# Patient Record
Sex: FEMALE | Race: OTHER
[De-identification: names, ages, dates, MRNs, and addresses within clinical notes are randomized per-mention and may not be internally consistent; named-entity substitution may affect disease eponyms.]

---

## 2018-03-03 ENCOUNTER — HOSPITAL ENCOUNTER (INPATIENT)
Dept: HOSPITAL 74 - JER | Age: 58
LOS: 3 days | Discharge: HOME | DRG: 207 | End: 2018-03-06
Attending: INTERNAL MEDICINE | Admitting: INTERNAL MEDICINE
Payer: COMMERCIAL

## 2018-03-03 VITALS — BODY MASS INDEX: 26.6 KG/M2

## 2018-03-03 DIAGNOSIS — E87.6: ICD-10-CM

## 2018-03-03 DIAGNOSIS — R65.10: ICD-10-CM

## 2018-03-03 DIAGNOSIS — I08.1: ICD-10-CM

## 2018-03-03 DIAGNOSIS — R00.0: ICD-10-CM

## 2018-03-03 DIAGNOSIS — R50.9: ICD-10-CM

## 2018-03-03 DIAGNOSIS — D72.828: ICD-10-CM

## 2018-03-03 DIAGNOSIS — I31.9: Primary | ICD-10-CM

## 2018-03-03 DIAGNOSIS — E83.39: ICD-10-CM

## 2018-03-03 DIAGNOSIS — I48.91: ICD-10-CM

## 2018-03-03 DIAGNOSIS — I31.3: ICD-10-CM

## 2018-03-03 DIAGNOSIS — K80.50: ICD-10-CM

## 2018-03-03 DIAGNOSIS — R07.89: ICD-10-CM

## 2018-03-03 LAB
ALBUMIN SERPL-MCNC: 3.4 G/DL (ref 3.4–5)
ALBUMIN SERPL-MCNC: 3.8 G/DL (ref 3.4–5)
ALP SERPL-CCNC: 126 U/L (ref 45–117)
ALP SERPL-CCNC: 139 U/L (ref 45–117)
ALT SERPL-CCNC: 49 U/L (ref 12–78)
ALT SERPL-CCNC: 51 U/L (ref 12–78)
AMYLASE SERPL-CCNC: 57 U/L (ref 25–115)
ANION GAP SERPL CALC-SCNC: 12 MMOL/L (ref 8–16)
ANION GAP SERPL CALC-SCNC: 6 MMOL/L (ref 8–16)
APPEARANCE UR: CLEAR
APTT BLD: 32.6 SECONDS (ref 26.9–34.4)
AST SERPL-CCNC: 27 U/L (ref 15–37)
AST SERPL-CCNC: 31 U/L (ref 15–37)
BASOPHILS # BLD: 0.3 % (ref 0–2)
BILIRUB SERPL-MCNC: 0.4 MG/DL (ref 0.2–1)
BILIRUB SERPL-MCNC: 0.6 MG/DL (ref 0.2–1)
BILIRUB UR STRIP.AUTO-MCNC: NEGATIVE MG/DL
BUN SERPL-MCNC: 11 MG/DL (ref 7–18)
BUN SERPL-MCNC: 12 MG/DL (ref 7–18)
CALCIUM SERPL-MCNC: 8.1 MG/DL (ref 8.5–10.1)
CALCIUM SERPL-MCNC: 8.3 MG/DL (ref 8.5–10.1)
CHLORIDE SERPL-SCNC: 104 MMOL/L (ref 98–107)
CHLORIDE SERPL-SCNC: 108 MMOL/L (ref 98–107)
CHOLEST SERPL-MCNC: 155 MG/DL (ref 50–200)
CO2 SERPL-SCNC: 23 MMOL/L (ref 21–32)
CO2 SERPL-SCNC: 26 MMOL/L (ref 21–32)
COLOR UR: YELLOW
CREAT SERPL-MCNC: 0.5 MG/DL (ref 0.55–1.02)
CREAT SERPL-MCNC: 0.6 MG/DL (ref 0.55–1.02)
DEPRECATED RDW RBC AUTO: 13.5 % (ref 11.6–15.6)
EOSINOPHIL # BLD: 0 % (ref 0–4.5)
EPITH CASTS URNS QL MICRO: (no result) /HPF
GLUCOSE SERPL-MCNC: 109 MG/DL (ref 74–106)
GLUCOSE SERPL-MCNC: 150 MG/DL (ref 74–106)
HCT VFR BLD CALC: 37.2 % (ref 32.4–45.2)
HDLC SERPL-MCNC: 62 MG/DL (ref 40–60)
HGB BLD-MCNC: 12.5 GM/DL (ref 10.7–15.3)
INR BLD: 1.1 (ref 0.82–1.09)
KETONES UR QL STRIP: NEGATIVE
LDLC SERPL CALC-MCNC: 87 MG/DL (ref 5–100)
LEUKOCYTE ESTERASE UR QL STRIP.AUTO: NEGATIVE
LIPASE SERPL-CCNC: 72 U/L (ref 73–393)
LYMPHOCYTES # BLD: 3.5 % (ref 8–40)
MAGNESIUM SERPL-MCNC: 2.1 MG/DL (ref 1.8–2.4)
MCH RBC QN AUTO: 31.3 PG (ref 25.7–33.7)
MCHC RBC AUTO-ENTMCNC: 33.7 G/DL (ref 32–36)
MCV RBC: 92.9 FL (ref 80–96)
MONOCYTES # BLD AUTO: 4 % (ref 3.8–10.2)
MUCOUS THREADS URNS QL MICRO: (no result)
NEUTROPHILS # BLD: 92.2 % (ref 42.8–82.8)
NITRITE UR QL STRIP: NEGATIVE
PH UR: 6 [PH] (ref 5–8)
PHOSPHATE SERPL-MCNC: 3.5 MG/DL (ref 2.5–4.9)
PLATELET # BLD AUTO: 264 K/MM3 (ref 134–434)
PMV BLD: 8.8 FL (ref 7.5–11.1)
POTASSIUM SERPLBLD-SCNC: 3.3 MMOL/L (ref 3.5–5.1)
POTASSIUM SERPLBLD-SCNC: 4.3 MMOL/L (ref 3.5–5.1)
PROT SERPL-MCNC: 6.8 G/DL (ref 6.4–8.2)
PROT SERPL-MCNC: 7.5 G/DL (ref 6.4–8.2)
PROT UR QL STRIP: NEGATIVE
PROT UR QL STRIP: NEGATIVE
PT PNL PPP: 12.4 SEC (ref 9.98–11.88)
RBC # BLD AUTO: 4 M/MM3 (ref 3.6–5.2)
RBC # UR STRIP: (no result) /UL
SODIUM SERPL-SCNC: 139 MMOL/L (ref 136–145)
SODIUM SERPL-SCNC: 140 MMOL/L (ref 136–145)
SP GR UR: 1.02 (ref 1–1.03)
TRIGL SERPL-MCNC: 51 MG/DL (ref 35–160)
UROBILINOGEN UR STRIP-MCNC: NEGATIVE MG/DL (ref 0.2–1)
WBC # BLD AUTO: 14.7 K/MM3 (ref 4–10)

## 2018-03-03 RX ADMIN — FAMOTIDINE SCH MLS/HR: 20 INJECTION, SOLUTION INTRAVENOUS at 10:30

## 2018-03-03 RX ADMIN — FAMOTIDINE SCH MLS/HR: 20 INJECTION, SOLUTION INTRAVENOUS at 22:13

## 2018-03-03 RX ADMIN — PROMETHAZINE HYDROCHLORIDE PRN MG: 25 INJECTION INTRAMUSCULAR; INTRAVENOUS at 17:34

## 2018-03-03 RX ADMIN — DEXTROSE AND SODIUM CHLORIDE SCH MLS/HR: 5; 900 INJECTION, SOLUTION INTRAVENOUS at 15:15

## 2018-03-03 RX ADMIN — CEFTRIAXONE SCH MLS/HR: 1 INJECTION, SOLUTION INTRAVENOUS at 13:17

## 2018-03-03 RX ADMIN — HEPARIN SODIUM SCH UNIT: 5000 INJECTION, SOLUTION INTRAVENOUS; SUBCUTANEOUS at 22:13

## 2018-03-03 RX ADMIN — ACETAMINOPHEN PRN MG: 325 TABLET ORAL at 17:11

## 2018-03-03 RX ADMIN — HEPARIN SODIUM SCH: 5000 INJECTION, SOLUTION INTRAVENOUS; SUBCUTANEOUS at 14:25

## 2018-03-03 RX ADMIN — HEPARIN SODIUM SCH UNIT: 5000 INJECTION, SOLUTION INTRAVENOUS; SUBCUTANEOUS at 10:30

## 2018-03-03 NOTE — EKG
Test Reason : 

Blood Pressure : ***/*** mmHG

Vent. Rate : 076 BPM     Atrial Rate : 076 BPM

   P-R Int : 156 ms          QRS Dur : 090 ms

    QT Int : 396 ms       P-R-T Axes : 061 036 030 degrees

   QTc Int : 445 ms

 

NORMAL SINUS RHYTHM

WHEN COMPARED WITH ECG OF 03-MAR-2018 04:17,

NO SIGNIFICANT CHANGE WAS FOUND

Confirmed by MD SUSAN, NICOLA ([id number]) on 3/3/2018 11:37:37 AM

 

Referred By: SCOUT GASCA           Confirmed By:NICOLA DAVIS MD

## 2018-03-03 NOTE — EKG
Test Reason : 

Blood Pressure : ***/*** mmHG

Vent. Rate : 075 BPM     Atrial Rate : 075 BPM

   P-R Int : 152 ms          QRS Dur : 086 ms

    QT Int : 388 ms       P-R-T Axes : 048 032 024 degrees

   QTc Int : 433 ms

 

*** POOR DATA QUALITY, INTERPRETATION MAY BE ADVERSELY AFFECTED

NORMAL SINUS RHYTHM

NORMAL ECG

NO PREVIOUS ECGS AVAILABLE

Confirmed by MD SUSAN, NICOLA ([id number]) on 3/3/2018 11:38:17 AM

 

Referred By:             Confirmed By:NICOLA DAVIS MD

## 2018-03-03 NOTE — PDOC
History of Present Illness





- General


Chief Complaint: Chest Pain


Stated Complaint: CHEST DISCOMFORT


Time Seen by Provider: 03/03/18 02:47


History Source: Patient, Family


Exam Limitations: Language Barrier





- History of Present Illness


Initial Comments: 





03/03/18 02:59





58 y/o F with no significant PMH who presents to the ED c/o worsening chest 

pain over the past 10 hrs. As per pt, at 5PM she started to develop dull chest 

pressure in her mid-sternal area. Pt tried to sleep this evening and was woken 

up, as the pain became a 10/10, with sternal pressure at rest, radiating to the 

back. Her pain is worsened by movement, particularly by leaning forward, and is 

a/w SOB. Pain has no alleviating factors. She also endorses cold fingertips and 

nausea. Pt denies HA, fever, diaphoresis, cough, chills, V/D, recent illnesses, 

or changes in urinary or bowel function.





She had the same pain a week ago, however it subsided on its own.





PMH: none


PsxH: denies


meds: denies


allergies: NKDA


FH: mother- had similar pain in past


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 denies cigarette, alcohol, or 

recreational drug use.








03/03/18 05:45








Past History





- Past Medical History


Allergies/Adverse Reactions: 


 Allergies











Allergy/AdvReac Type Severity Reaction Status Date / Time


 


No Known Allergies Allergy   Verified 03/03/18 02:48











Home Medications: 


Ambulatory Orders





NK [No Known Home Medication]  03/03/18 











- Suicide/Smoking/Psychosocial Hx


Smoking History: Smoker current status UNK


Have you smoked in the past 12 months: No


Information on smoking cessation initiated: No


Hx Alcohol Use: No


Drug/Substance Use Hx: No





**Review of Systems





- Review of Systems


Able to Perform ROS?: Yes


Is the patient limited English proficient: Yes


Respiratory: Yes: Shortness of Breath, SOB at Rest


Cardiac (ROS): Yes: Chest Pain


All Other Systems: Reviewed and Negative





*Physical Exam





- Vital Signs


 Last Vital Signs











Temp Pulse Resp BP Pulse Ox


 


 98.1 F   78   22   134/79   99 


 


 03/03/18 02:46  03/03/18 02:46  03/03/18 02:46  03/03/18 02:46  03/03/18 02:46














- Physical Exam


General Appearance: Yes: Nourished, Mild Distress


HEENT: positive: EOMI, JULIO CÉSAR


Neck: positive: Supple


Respiratory/Chest: positive: Chest Tender, Lungs Clear, Normal Breath Sounds


Cardiovascular: positive: Regular Rhythm, Regular Rate, S1, S2


Vascular Pulses: Dorsalis-Pedis (R): 2+, Doralis-Pedis (L): 2+


Gastrointestinal/Abdominal: positive: Normal Bowel Sounds, Soft


Musculoskeletal: positive: Normal Inspection


Extremity: positive: Normal Range of Motion


Neurologic: positive: CNs II-XII NML intact





**Heart Score/ECG Review





- History


History: Moderately suspicious





- Electrocardiogram


EKG: Normal





- Age


Age: 45-65





- Risk Factors


Risk Factors Heart Score: Yes Hx Obesity


Based on the list above the patient has:: 1-2 risk factors





- Troponin


Troponin: 1-3x normal limit





- Score


Heart Score - Total: 4





ED Treatment Course





- LABORATORY


CBC & Chemistry Diagram: 


 03/03/18 04:29





 03/03/18 04:29





Medical Decision Making





- Medical Decision Making





03/03/18 03:16





58 y/o F with no significant PMH who presents to the ED c/o worsening chest 

pain over the past 10 hrs. Current differentials include: gastritis, r/o ACS, 

prinzmetal's angina, PE (less likely), costochondritis. Pt with reproducible, 

pleuritic chest pain. 





Will order the following


-Trops


-CXR


-EKG


-20 mg pepcid, maalox


-650mg Tylenol 





03/03/18 03:57


Pt still c/o abdominal discomfort


bedside US - with possible gallstones, will need RUQ sono to check CBD. r/o 

choledocholithiasis


Will give morphine 2mg for pain, protonix 40mg IVP x 1 





03/03/18 04:01


initial trop 0.11 will trend - next trop at 9:30AM


will give aspirin 324 mg x 1 


r/o ACS





03/03/18 04:23





EKG - WNL. normal sinus, normal axis, vent rate 75bpm,  ms, QRS 86ms, Qtc 

433ms  





03/03/18 04:52


14.7 leukocytosis -?GI source infection however afebrile , CMP pending 


ALP mildly elevated 


K 3.3 will supplement.





03/03/18 05:48


Microblog sent 





03/03/18 06:10


Case discussed, will go to AM team- for now will put in for tele as pt with 

HEART 4, elevated trop


Consult- Dr. Montgomery





03/03/18 06:11








*DC/Admit/Observation/Transfer


Diagnosis at time of Disposition: 


Chest pain


Qualifiers:


 Chest pain type: unspecified Qualified Code(s): R07.9 - Chest pain, unspecified








- Discharge Dispostion


Condition at time of disposition: Fair


Admit: Yes





- Referrals


Referrals: 


ON STAFF,NOT [Primary Care Provider] - 





- Patient Instructions





- Post Discharge Activity

## 2018-03-03 NOTE — PN
Teaching Attending Note


Name of Resident: Albin Clarke





ATTENDING PHYSICIAN STATEMENT


Time of evaluation: 8:30 Am


I saw and evaluated the patient.


I reviewed the resident's note and discussed the case with the resident.


I agree with the resident's findings and plan as documented.








SUBJECTIVE:


57 yof with no significant pMHX comes with chest pain starting last night, 

reports substernal/epigastric dull, worsened with movements and sitting up, 

inability to catch full breath, initially radiation to back that markedly 

improved with morphine in the ED. Had an episode of vomiting in the ED, denies 

any fevers chills, otherwise abdominal pain, or changes in bowels. Similar 

milder episode sometime back with spontaneous resolution. 


Currently with minimal discomfort in lower sternal/epigastric region thats 

worse with touch, movements and deep breath sounds with focal tenderness in the 

area. 


12 point ROS done, neg except above. 





OBJECTIVE:


 Vital Signs











 Period  Temp  Pulse  Resp  BP Sys/Nunez  Pulse Ox


 


 Last 24 Hr  98 F-98.1 F  69-78  16-22  127-134/63-79  








 Intake & Output











 02/28/18 03/01/18 03/02/18 03/03/18





 23:59 23:59 23:59 23:59


 


Intake Total    400


 


Balance    400


 


Weight    140 lb











GENERAL: Awake, alert, and fully oriented, in no acute distress.


HEAD: Normal with no signs of trauma.


EYES: Pupils equal, round and reactive to light, extraocular movements intact, 

sclera anicteric, conjunctiva clear. No lid lag.


EARS, NOSE, THROAT: Ears normal, nares patent, oropharynx clear without 

exudates. Moist mucous membranes.


NECK: Normal range of motion, supple without lymphadenopathy, JVD, or masses.


LUNGS: Breath sounds equal, clear to auscultation bilaterally. No wheezes, and 

no crackles. No accessory muscle use. tenderness in lower sternal region


HEART: Regular rate and rhythm, normal S1 and S2 without murmur, rub or gallop.


ABDOMEN: Soft, mild tenderness in epigastric region, neg [place] sign(examined 

at bedside), not distended, normoactive bowel sounds, no guarding, no rebound, 

no masses.  No hepatomegaly or  splenomegaly. 


MUSCULOSKELETAL: Normal range of motion at all joints. No bony deformities or 

tenderness. No CVA tenderness.


UPPER EXTREMITIES: 2+ pulses, warm, well-perfused. No cyanosis. No clubbing. No 

peripheral edema.


LOWER EXTREMITIES: 2+ pulses, warm, well-perfused. No calf tenderness. No 

peripheral edema. 


NEUROLOGICAL:  Cranial nerves II-XII intact. Normal speech. 


PSYCHIATRIC: Cooperative. Good eye contact. Appropriate mood and affect.


SKIN: Warm, dry, normal turgor, no rashes or lesions noted, normal capillary 

refill.








 Home Medication List











 Medication  Instructions  Recorded  Confirmed  Type


 


NK [No Known Home Medication]  03/03/18 03/03/18 History








 Active Medications











Generic Name Dose Route Start Last Admin





  Trade Name Freq  PRN Reason Stop Dose Admin


 


Acetaminophen  650 mg  03/03/18 09:16  





  Tylenol -  PO   





  Q6H PRN   





  PAIN LEVEL 1-5   


 


Heparin Sodium (Porcine)  5,000 unit  03/03/18 10:00  03/03/18 10:30





  Heparin -  SQ   5,000 unit





  TID SHAREE   Administration


 


Famotidine/Sodium Chloride  20 mg in 50 mls @ 100 mls/hr  03/03/18 10:00  03/03/ 18 10:30





  Pepcid 20 Mg Premixed Ivpb -  IVPB   100 mls/hr





  BID SHAREE   Administration


 


CEFTRIAXONE 1 G/50 ML PREMIX  50 mls @ 100 mls/hr  03/03/18 11:00  





  Ceftriaxone 1 Gm-D5w Bag  IVPB   





  DAILY SHAREE   


 


Metronidazole  500 mg in 100 mls @ 100 mls/hr  03/03/18 11:00  





  Flagyl 500mg Premixed Ivpb -  IVPB   





  Q8H-IV SHAREE   


 


Dextrose/Sodium Chloride  1,000 mls @ 75 mls/hr  03/03/18 10:45  





  D5-Ns -  IV   





  ASDIR SHAREE   


 


Morphine Sulfate  1 mg  03/03/18 09:14  





  Morphine Injection -  IM   





  Q6H PRN   





  PAIN LEVEL 6-10   


 


Promethazine HCl  12.5 mg  03/03/18 09:12  





  Phenergan Injection -  IVPB   





  Q6H PRN   





  NAUSEA AND/OR VOMITING   











 Laboratory Results - last 24 hr











  03/03/18 03/03/18 03/03/18





  03:23 04:29 04:29


 


WBC   14.7 H 


 


RBC   4.00 


 


Hgb   12.5 


 


Hct   37.2 


 


MCV   92.9 


 


MCH   31.3 


 


MCHC   33.7 


 


RDW   13.5 


 


Plt Count   264 


 


MPV   8.8 


 


Neutrophils %   92.2 H 


 


Lymphocytes %   3.5 L 


 


Monocytes %   4.0 


 


Eosinophils %   0.0 


 


Basophils %   0.3 


 


PT with INR   


 


INR   


 


PTT (Actin FS)   


 


D-Dimer   


 


Sodium    139


 


Potassium    3.3 L


 


Chloride    104


 


Carbon Dioxide    23


 


Anion Gap    12


 


BUN    12


 


Creatinine    0.5 L


 


Creat Clearance w eGFR    > 60


 


Random Glucose    150 H


 


Calcium    8.3 L


 


Phosphorus   


 


Magnesium   


 


Total Bilirubin    0.4


 


AST    31


 


ALT    51


 


Alkaline Phosphatase    139 H


 


Creatine Kinase  93  


 


Troponin I  0.11 H  


 


Total Protein    7.5


 


Albumin    3.8


 


Triglycerides   


 


Cholesterol   


 


Total LDL Cholesterol   


 


HDL Cholesterol   














  03/03/18 03/03/18 03/03/18





  04:29 09:05 09:05


 


WBC   


 


RBC   


 


Hgb   


 


Hct   


 


MCV   


 


MCH   


 


MCHC   


 


RDW   


 


Plt Count   


 


MPV   


 


Neutrophils %   


 


Lymphocytes %   


 


Monocytes %   


 


Eosinophils %   


 


Basophils %   


 


PT with INR  12.40 H  


 


INR  1.10  


 


PTT (Actin FS)  32.6  


 


D-Dimer   602 H 


 


Sodium    140


 


Potassium    4.3


 


Chloride    108 H


 


Carbon Dioxide    26


 


Anion Gap    6 L


 


BUN    11


 


Creatinine    0.6


 


Creat Clearance w eGFR    > 60


 


Random Glucose    109 H


 


Calcium    8.1 L


 


Phosphorus    3.5


 


Magnesium    2.1


 


Total Bilirubin    0.6  D


 


AST    27


 


ALT    49


 


Alkaline Phosphatase    126 H


 


Creatine Kinase   


 


Troponin I   


 


Total Protein    6.8


 


Albumin    3.4


 


Triglycerides    51


 


Cholesterol    155


 


Total LDL Cholesterol    87


 


HDL Cholesterol    62 H














  03/03/18





  09:05


 


WBC 


 


RBC 


 


Hgb 


 


Hct 


 


MCV 


 


MCH 


 


MCHC 


 


RDW 


 


Plt Count 


 


MPV 


 


Neutrophils % 


 


Lymphocytes % 


 


Monocytes % 


 


Eosinophils % 


 


Basophils % 


 


PT with INR 


 


INR 


 


PTT (Actin FS) 


 


D-Dimer 


 


Sodium 


 


Potassium 


 


Chloride 


 


Carbon Dioxide 


 


Anion Gap 


 


BUN 


 


Creatinine 


 


Creat Clearance w eGFR 


 


Random Glucose 


 


Calcium 


 


Phosphorus 


 


Magnesium 


 


Total Bilirubin 


 


AST 


 


ALT 


 


Alkaline Phosphatase 


 


Creatine Kinase 


 


Troponin I  0.06 H


 


Total Protein 


 


Albumin 


 


Triglycerides 


 


Cholesterol 


 


Total LDL Cholesterol 


 


HDL Cholesterol 








 





ASSESSMENT AND PLAN:


57 yof with no significant PMHX admitted with lower chest/epigastric pain, 

found with symptomatic gall bladder disease and cholelithiasis





-Chest/Epigastric pain, earlier radiation to back, Symptomatic gall bladder 

disease +/- Mild acute gall stone pancreatitis


-Atypical chest pain, low suspicion for ACS given presentation and above 

findings,unlikely PE given absence of tachycardia, hypoxia, risk factors, 

strong alternative explanation and tenderness on exam





Plan:


NPO, surgery consult with Dr. Box.


Check lipase/amylase. 


Ceftriaxone/flagyl, D5NS IVF. 


MRCP, GI input accordingly. 


Tn noted. Telemetry, check 2 D echo.


Cardiology consulted in ED,low suspicion currently. Repeat EKG


GI/DVTPPX 


dispo pending resolution of medical issues. 


Plan discussed with patient in detail, all questions answered.

## 2018-03-03 NOTE — PDOC
Attending Attestation





- Resident


Resident Name: Lacey Cook





- ED Attending Attestation


I have performed the following: I have examined & evaluated the patient, The 

case was reviewed & discussed with the resident, I agree w/resident's findings 

& plan, Exceptions are as noted





- HPI


HPI: 





03/03/18 03:47


57-year-old female no medical history presents with chest discomfort. The 

patient was fine yesterday, went to bed and then woke up with reproducible 

midsternal chest pain and epigastric discomfort. States radiating to her throat 

and makes her feel short of breath. Reports that the pain radiates to the back. 

Reports nausea but denies vomiting. Denies fevers or chills. Does not think any 

particular foods were causing the symptoms.





- Physicial Exam


PE: 





03/03/18 03:47





GENERAL: Awake, alert, and fully oriented. Uncomfortable appearing


HEAD: No signs of trauma


EYES: PERRLA, EOMI, sclera anicteric, conjunctiva clear


ENT: Auricles normal inspection, hearing grossly normal, nares patent


NECK: Normal ROM, supple


LUNGS: Breath sounds equal, clear to auscultation bilaterally.  No wheezes, and 

no crackles


HEART: Regular rate and rhythm, normal S1 and S2, no murmurs, rubs or gallops


ABDOMEN: TTP epigastric. White sign negative. Soft, No guarding, no rebound.  

No masses


EXTREMITIES: Normal range of motion, no edema.  No clubbing or cyanosis. No 

cords, erythema, or tenderness


NEUROLOGICAL: Cranial nerves II through XII grossly intact.  Normal speech, 

normal gait


SKIN: Warm, Dry, normal turgor, no rashes or lesions noted.





- Medical Decision Making





03/03/18 03:48





 Vital Signs











Temp Pulse Resp BP Pulse Ox


 


 98.1 F   78   22   134/79   99 


 


 03/03/18 02:46  03/03/18 02:46  03/03/18 02:46  03/03/18 02:46  03/03/18 02:46








57-year-old female with no past medical history presents with atypical chest 

pain. We'll rule out acute coronary syndrome. I suspect this may be GI as well. 

such as gastritis. We'll do a bedside ultrasound to evaluate for RUQ. Treat 

symptoms and reassess.


03/03/18 04:05





03/03/18 04:22








03/03/18 05:26





 CBC, BMP





 03/03/18 04:29 





 03/03/18 04:29 





 CMP











Sodium  139 mmol/L (136-145)   03/03/18  04:29    


 


Potassium  3.3 mmol/L (3.5-5.1)  L  03/03/18  04:29    


 


Chloride  104 mmol/L ()   03/03/18  04:29    


 


Carbon Dioxide  23 mmol/L (21-32)   03/03/18  04:29    


 


Anion Gap  12  (8-16)   03/03/18  04:29    


 


BUN  12 mg/dL (7-18)   03/03/18  04:29    


 


Creatinine  0.5 mg/dL (0.55-1.02)  L  03/03/18  04:29    


 


Creat Clearance w eGFR  > 60  (>60)   03/03/18  04:29    


 


Random Glucose  150 mg/dL ()  H  03/03/18  04:29    


 


Calcium  8.3 mg/dL (8.5-10.1)  L  03/03/18  04:29    


 


Total Bilirubin  0.4 mg/dL (0.2-1.0)   03/03/18  04:29    


 


AST  31 U/L (15-37)   03/03/18  04:29    


 


ALT  51 U/L (12-78)   03/03/18  04:29    


 


Alkaline Phosphatase  139 U/L ()  H  03/03/18  04:29    


 


Creatine Kinase  93 IU/L ()   03/03/18  03:23    


 


Troponin I  0.11 ng/ml (0.00-0.05)  H  03/03/18  03:23    


 


Total Protein  7.5 g/dl (6.4-8.2)   03/03/18  04:29    


 


Albumin  3.8 g/dl (3.4-5.0)   03/03/18  04:29    








Labs reviewed. 


Ultrasound is still pending and needed to r/o acute johnie.


However, with trop of 0.11, will need to consider as ACS.


Aspirin and admit to the hospital on telemetry.





**Heart Score/ECG Review





- History


History: Moderately suspicious





- Electrocardiogram


EKG: Normal





- Age


Age: 45-65





- Risk Factors


Risk Factors Heart Score: Yes Hx Obesity


Based on the list above the patient has:: 1-2 risk factors





- Troponin


Troponin: 1-3x normal limit





- Score


Heart Score - Total: 4


  ** #1


ECG reviewed & interpreted by me at: 04:20





03/03/18 04:22


NSR 75, no std/brayden, normal axis, normal intervals,  msec

## 2018-03-03 NOTE — HP
CHIEF COMPLAINT: chest pain 





PCP: no pcp





HISTORY OF PRESENT ILLNESS: 58 y/o female with no significant past medical 

history came to hospital with complain of chest pain which started last evening 

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 

gradually increased and went upto 10/10 in intensity which woke her up from  

her sleep. Pain increases with breathing, and when she touch her lower part of 

sternum, pain also increases when she leans forward. Non radiating. No increase 

of pain with eating.  Pain was not associated with diaphoresis, palpitations, 

dizziness, lightheadedness. Denies othopnea, paroxysmal dyspnea, denies 

swelling in legs,  Denies trauma to chest, Denies heart burn, denies recent 

travel. Denies fever, cough, chills. Patient reports one episode of vomiting in 

hospital after her blood work was done. 





Pain got better after  getting morphine in ED 





ER course was notable for:


(1)cbc, cmp, ekg


(2)cxr 








Recent Travel:no





PAST MEDICAL HISTORY: no





PAST SURGICAL HISTORY: no





Social History:


Smoking: no


Alcohol: no


Drugs: no





Family History: No family h/o CAD


Allergies





No Known Allergies Allergy (Verified 03/03/18 02:48)


 








HOME MEDICATIONS:


 Home Medications











 Medication  Instructions  Recorded


 


NK [No Known Home Medication]  03/03/18








REVIEW OF SYSTEMS


CONSTITUTIONAL: 


Absent:  fever, chills, diaphoresis, generalized weakness, malaise, loss of 

appetite, weight change


HEENT: 


Absent:  rhinorrhea, nasal congestion, throat pain, throat swelling, difficulty 

swallowing, mouth swelling, ear pain, eye pain, visual changes


CARDIOVASCULAR: 


Absent: , syncope, palpitations, irregular heart rate, lightheadedness, 

peripheral edema


RESPIRATORY: 


Absent: cough, shortness of breath, dyspnea with exertion, orthopnea, wheezing, 

stridor, hemoptysis


GASTROINTESTINAL:


Absent: abdominal pain, abdominal distension, nausea, vomiting, diarrhea, 

constipation, melena, hematochezia


GENITOURINARY: 


Absent: dysuria, frequency, urgency, hesitancy, hematuria, flank pain, genital 

pain


MUSCULOSKELETAL: 


Absent: myalgia, arthralgia, joint swelling, back pain, neck pain


NEUROLOGIC: 


Absent: headache, focal weakness or paresthesias, dizziness, 


PSYCHIATRIC: 


Absent: anxiety, depression, 





PHYSICAL EXAMINATION


 Vital Signs - 24 hr











  03/03/18 03/03/18





  02:46 08:10


 


Temperature 98.1 F 98 F


 


Pulse Rate 78 


 


Pulse Rate [  69





Right Radial]  


 


Respiratory 22 16





Rate  


 


Blood Pressure 134/79 


 


Blood Pressure  127/63





[Left Arm]  


 


O2 Sat by Pulse 99 100





Oximetry (%)  











GENERAL: Awake, alert, and fully oriented, in no acute distress.


HEAD: Normal with no signs of trauma.


EYES: Pupils equal, round and reactive to light, extraocular movements intact, 

sclera anicteric, conjunctiva clear. No lid lag.


EARS, NOSE, THROAT: Ears normal, nares patent, oropharynx clear without 

exudates. Moist mucous membranes.


NECK: Normal range of motion, supple without lymphadenopathy, JVD, or masses.


LUNGS: Breath sounds equal, clear to auscultation bilaterally. No wheezes, and 

no crackles. No accessory muscle use.


HEART: Regular rate and rhythm, normal S1 and S2 without murmur, rub or gallop.


ABDOMEN: Soft, nontender, not distended, normoactive bowel sounds, no guarding, 

no rebound, no masses.  No hepatomegaly or  splenomegaly. 


MUSCULOSKELETAL: mild tenderness on lower costochondral juntions. 


UPPER EXTREMITIES: 2+ pulses, warm, well-perfused.


LOWER EXTREMITIES: 2+ pulses, warm, well-perfused. No calf tenderness. No 

peripheral edema. 


NEUROLOGICAL:  Cranial nerves II-XII intact. Normal speech. Normal gait.


PSYCHIATRIC: Cooperative. Good eye contact. Appropriate mood and affect.


SKIN: Warm, dry, normal turgor, no rashes or lesions noted, normal capillary 

refill. 


 Laboratory Results - last 24 hr











  03/03/18 03/03/18 03/03/18





  03:23 04:29 04:29


 


WBC   14.7 H 


 


RBC   4.00 


 


Hgb   12.5 


 


Hct   37.2 


 


MCV   92.9 


 


MCH   31.3 


 


MCHC   33.7 


 


RDW   13.5 


 


Plt Count   264 


 


MPV   8.8 


 


Neutrophils %   92.2 H 


 


Lymphocytes %   3.5 L 


 


Monocytes %   4.0 


 


Eosinophils %   0.0 


 


Basophils %   0.3 


 


PT with INR   


 


INR   


 


PTT (Actin FS)   


 


Sodium    139


 


Potassium    3.3 L


 


Chloride    104


 


Carbon Dioxide    23


 


Anion Gap    12


 


BUN    12


 


Creatinine    0.5 L


 


Creat Clearance w eGFR    > 60


 


Random Glucose    150 H


 


Calcium    8.3 L


 


Total Bilirubin    0.4


 


AST    31


 


ALT    51


 


Alkaline Phosphatase    139 H


 


Creatine Kinase  93  


 


Troponin I  0.11 H  


 


Total Protein    7.5


 


Albumin    3.8














  03/03/18





  04:29


 


WBC 


 


RBC 


 


Hgb 


 


Hct 


 


MCV 


 


MCH 


 


MCHC 


 


RDW 


 


Plt Count 


 


MPV 


 


Neutrophils % 


 


Lymphocytes % 


 


Monocytes % 


 


Eosinophils % 


 


Basophils % 


 


PT with INR  12.40 H


 


INR  1.10


 


PTT (Actin FS)  32.6


 


Sodium 


 


Potassium 


 


Chloride 


 


Carbon Dioxide 


 


Anion Gap 


 


BUN 


 


Creatinine 


 


Creat Clearance w eGFR 


 


Random Glucose 


 


Calcium 


 


Total Bilirubin 


 


AST 


 


ALT 


 


Alkaline Phosphatase 


 


Creatine Kinase 


 


Troponin I 


 


Total Protein 


 


Albumin 











ASSESSMENT/PLAN:   58 y/o female with no significant past medical history came 

to hospital with complain of chest pain 








Chest pain: d/d costochondritis, symptomatic gall stone ds, CAD, 


cardiac monitor 


Trend trop i: initial trop 0.11


monitor vitals 


Repeat ekg, cmp, mg, phos 


cardiology consult: DR torres


ECHO


lipid profile 


Ultrasound reviewed. 


surgery consult: for symptomatic gall stone ds. 





Leucocytosis:


could be from gall stone ds or stress induced 


repeat cbc tomorrow. 





Fluid: NS 75ml/hr


electrolyte: repeat sr k, got 20meq k in ed 


nutrition: NPO for now 





DVT pro: heparin 


Gi pro: pepcid 





dispo: admit tele 














Visit type





- Emergency Visit


Emergency Visit: Yes


ED Registration Date: 03/03/18


Care time: The patient presented to the Emergency Department on the above date 

and was hospitalized for further evaluation of their emergent condition.





- New Patient


This patient is new to me today: Yes


Date on this admission: 03/03/18





- Critical Care


Critical Care patient: No





Hospitalist Screening





- Colonoscopy Questionnaire


Colonoscopy Questionnaire: 





Colonoscopy Questionnaire








-   Patient:


50 - 75 years old and never had a screening colonoscopy: Unknown


History of colon or rectal polyps, or CA: Unknown


History of IBD, Crohn's disease or UC: Unknown


History of abdominal radiation therapy as a child: Unknown





-   Relative:


1 with colon or rectal CA, or polyps at age 60 or younger: Unknown


Colon or rectal CA diagnosed at age 45 or younger: Unknown


Multiple relatives with colon or rectal CA: Unknown





-   Outcome:


Screening Result: Negative Screen

## 2018-03-03 NOTE — CON.CARD
Consult


Consult Specialty:: Cardiology


Reason for Consultation:: chest pain





- History of Present Illness


History of Present Illness: 


58 y/o female with no significant past medical history admitted with constant 

chest pain which started yesterday, constant, pressure type, with radiation to 

the shoulders. Pain increases with breathing, and when she touch her lower part 

of sternum, pain also increases when she leans forward.  No diaphoresis, 

palpitations, dizziness, lightheadedness. Denies othopnea, paroxysmal dyspnea. 

ECG was normal. Pain got better after  getting morphine in ED She has minimally 

elevated TP-I and elevated DDimer





- History Source


History Provided By: Patient, Medical Record


Limitations to Obtaining History: No Limitations





- Alcohol/Substance Use


Hx Alcohol Use: No





- Smoking History


Smoking history: Never smoked


Have you smoked in the past 12 months: No





Home Medications





- Allergies


Allergies/Adverse Reactions: 


 Allergies











Allergy/AdvReac Type Severity Reaction Status Date / Time


 


No Known Allergies Allergy   Verified 03/03/18 02:48














- Home Medications


Home Medications: 


Ambulatory Orders





NK [No Known Home Medication]  03/03/18 











Review of Systems





- Review of Systems


Constitutional: reports: No Symptoms


Eyes: reports: No Symptoms


HENT: reports: No Symptoms


Neck: reports: No Symptoms


Cardiovascular: reports: Chest Pain.  denies: Edema, Palpitations, Shortness of 

Breath


Respiratory: denies: Cough, Exercise Intolerance, Hemoptysis, Orthopnea, SOB


Gastrointestinal: reports: Abdominal Pain


Endocrine: reports: No Symptoms.  denies: Excessive Sweating, Flushing, 

Increased Hunger, Increased Thirst, Intolerance to Cold


Vital Signs: 


 Vital Signs











Temperature  99.0 F   03/03/18 14:11


 


Pulse Rate  80   03/03/18 14:11


 


Respiratory Rate  16   03/03/18 14:11


 


Blood Pressure  122/78   03/03/18 14:11


 


O2 Sat by Pulse Oximetry (%)  100   03/03/18 08:10











Constitutional: Yes: Well Nourished, No Distress, Calm


Eyes: Yes: Conjunctiva Clear, EOM Intact


HENT: Yes: Atraumatic, Normocephalic


Neck: Yes: Supple, Trachea Midline


Respiratory: Yes: Regular, CTA Bilaterally


Gastrointestinal: Yes: Normal Bowel Sounds, Soft


Cardiovascular: Yes: Regular Rate and Rhythm, Other (Moderate reproducible 

chest pain.).  No: Pulse Irregular, Gallop, Rub, Varicosities


JVD: No


Carotid Bruit: No


PMI: Non-Displaced


Heart Sounds: Yes: S1, S2


Murmur: No: Systolic Murmur, Diastolic Murmur


Edema: No





- Other Data


Labs, Other Data: 


 CBC, BMP





 03/03/18 04:29 





 03/03/18 09:05 





 INR, PTT











INR  1.10  (0.82-1.09)   03/03/18  04:29    








 Troponin, BNP











  03/03/18 03/03/18





  03:23 09:05


 


Troponin I  0.11 H  0.06 H








 Troponin, BNP











  03/03/18 03/03/18





  03:23 09:05


 


Troponin I  0.11 H  0.06 H














NSR no STT changes





Imaging





- Results


Chest X-ray: Report Reviewed





Problem List





- Problems


(1) Chest pain


Code(s): R07.9 - CHEST PAIN, UNSPECIFIED   


Qualifiers: 


   Chest pain type: unspecified   Qualified Code(s): R07.9 - Chest pain, 

unspecified   





Assessment/Plan





57 F with reporducible CP now relieved with mildly elevated TP-I and Ddimer 

level, leukocytosis and normal ECG.


Doubt acute ischemic injury. TP-i less sensitive adn CP atypical.





Suggest nuclear stress test to exclude CAD and assess LV function.

## 2018-03-04 LAB
ALBUMIN SERPL-MCNC: 3 G/DL (ref 3.4–5)
ALP SERPL-CCNC: 98 U/L (ref 45–117)
ALT SERPL-CCNC: 38 U/L (ref 12–78)
ANION GAP SERPL CALC-SCNC: 9 MMOL/L (ref 8–16)
AST SERPL-CCNC: 16 U/L (ref 15–37)
BASOPHILS # BLD: 0.1 % (ref 0–2)
BILIRUB SERPL-MCNC: 0.5 MG/DL (ref 0.2–1)
BUN SERPL-MCNC: 8 MG/DL (ref 7–18)
CALCIUM SERPL-MCNC: 7.6 MG/DL (ref 8.5–10.1)
CHLORIDE SERPL-SCNC: 111 MMOL/L (ref 98–107)
CO2 SERPL-SCNC: 23 MMOL/L (ref 21–32)
CREAT SERPL-MCNC: 0.5 MG/DL (ref 0.55–1.02)
DEPRECATED RDW RBC AUTO: 13.8 % (ref 11.6–15.6)
EOSINOPHIL # BLD: 0.1 % (ref 0–4.5)
GLUCOSE SERPL-MCNC: 121 MG/DL (ref 74–106)
HCT VFR BLD CALC: 33 % (ref 32.4–45.2)
HGB BLD-MCNC: 11.4 GM/DL (ref 10.7–15.3)
LYMPHOCYTES # BLD: 13 % (ref 8–40)
MCH RBC QN AUTO: 31.9 PG (ref 25.7–33.7)
MCHC RBC AUTO-ENTMCNC: 34.4 G/DL (ref 32–36)
MCV RBC: 92.6 FL (ref 80–96)
MONOCYTES # BLD AUTO: 9.6 % (ref 3.8–10.2)
NEUTROPHILS # BLD: 77.2 % (ref 42.8–82.8)
PLATELET # BLD AUTO: 223 K/MM3 (ref 134–434)
PMV BLD: 9.1 FL (ref 7.5–11.1)
POTASSIUM SERPLBLD-SCNC: 3.3 MMOL/L (ref 3.5–5.1)
PROT SERPL-MCNC: 6.3 G/DL (ref 6.4–8.2)
RBC # BLD AUTO: 3.56 M/MM3 (ref 3.6–5.2)
SODIUM SERPL-SCNC: 143 MMOL/L (ref 136–145)
WBC # BLD AUTO: 9.8 K/MM3 (ref 4–10)

## 2018-03-04 RX ADMIN — COLCHICINE SCH MG: 0.6 CAPSULE ORAL at 21:19

## 2018-03-04 RX ADMIN — FAMOTIDINE SCH MLS/HR: 20 INJECTION, SOLUTION INTRAVENOUS at 09:36

## 2018-03-04 RX ADMIN — IBUPROFEN SCH MG: 600 TABLET, FILM COATED ORAL at 21:19

## 2018-03-04 RX ADMIN — IBUPROFEN SCH MG: 600 TABLET, FILM COATED ORAL at 15:17

## 2018-03-04 RX ADMIN — HEPARIN SODIUM SCH UNIT: 5000 INJECTION, SOLUTION INTRAVENOUS; SUBCUTANEOUS at 06:08

## 2018-03-04 RX ADMIN — DEXTROSE AND SODIUM CHLORIDE SCH MLS/HR: 5; 900 INJECTION, SOLUTION INTRAVENOUS at 01:10

## 2018-03-04 RX ADMIN — CEFTRIAXONE SCH MLS/HR: 1 INJECTION, SOLUTION INTRAVENOUS at 09:36

## 2018-03-04 RX ADMIN — FAMOTIDINE SCH MLS/HR: 20 INJECTION, SOLUTION INTRAVENOUS at 21:20

## 2018-03-04 RX ADMIN — DEXTROSE AND SODIUM CHLORIDE SCH MLS/HR: 5; 900 INJECTION, SOLUTION INTRAVENOUS at 15:17

## 2018-03-04 NOTE — EKG
Test Reason : 

Blood Pressure : ***/*** mmHG

Vent. Rate : 156 BPM     Atrial Rate : 081 BPM

   P-R Int : 000 ms          QRS Dur : 080 ms

    QT Int : 272 ms       P-R-T Axes : 052 027 -02 degrees

   QTc Int : 438 ms

 

*** POOR DATA QUALITY, INTERPRETATION MAY BE ADVERSELY AFFECTED

ATRIAL FIBRILLATION WITH RAPID VENTRICULAR RESPONSE

NONSPECIFIC ST AND T WAVE ABNORMALITY

ABNORMAL ECG

Confirmed by MD SUSAN, NICOLA ([id number]) on 3/4/2018 2:20:31 PM

 

Referred By:             Confirmed By:NICOLA DAVIS MD

## 2018-03-04 NOTE — PN
Teaching Attending Note


Name of Resident: Jimmy Jesus





ATTENDING PHYSICIAN STATEMENT





I saw and evaluated the patient.


I reviewed the resident's note and discussed the case with the resident.


I agree with the resident's findings and plan as documented.








SUBJECTIVE:


Called as patient tachycardic, rapid afib 150s-170s, patient seen , reports 

palpitations and dizziness. Also still with lower sternal epigastric pain, 

intermittent but better and worse with deep breaths. no nausea, vomiting, chest 

pressure or new complaints otherwise. 





OBJECTIVE:


 Vital Signs











 Period  Temp  Pulse  Resp  BP Sys/Nunez  Pulse Ox


 


 Last 24 Hr  98 F-100.8 F    16-18  122-154/63-99  








 Intake & Output











 03/01/18 03/02/18 03/03/18 03/04/18





 23:59 23:59 23:59 23:59


 


Intake Total   1070 


 


Balance   1070 


 


Weight   146 lb 








General: lying in bed in no acute distress


Chest: CTAB, no rales or wheezing


Abdomen: soft, minimal lower sternal/epigastric tenderness, no voluntary or 

involuntary guarding or rigidity, positive bowel sounds


CVS;S1s2 irregular rapid


extremities: no edema





 Home Medication List











 Medication  Instructions  Recorded  Confirmed  Type


 


NK [No Known Home Medication]  03/03/18 03/03/18 History








 Active Medications











Generic Name Dose Route Start Last Admin





  Trade Name Sai  PRN Reason Stop Dose Admin


 


Acetaminophen  650 mg  03/03/18 09:16  03/03/18 17:11





  Tylenol -  PO   650 mg





  Q6H PRN   Administration





  PAIN LEVEL 1-5   


 


Diltiazem HCl  10 mg  03/04/18 07:57  





  Cardizem Injection -  IVPUSH  03/04/18 07:58  





  ONCE ONE   


 


Diltiazem HCl  30 mg  03/04/18 12:00  





  Cardizem -  PO   





  Q6HPO SHAREE   


 


Diltiazem HCl  30 mg  03/04/18 07:58  





  Cardizem -  PO  03/04/18 07:59  





  ONCE ONE   


 


Heparin Sodium (Porcine)  5,000 unit  03/03/18 10:00  03/04/18 06:08





  Heparin -  SQ   5,000 unit





  TID SHAREE   Administration


 


Famotidine/Sodium Chloride  20 mg in 50 mls @ 100 mls/hr  03/03/18 10:00  03/03/ 18 22:13





  Pepcid 20 Mg Premixed Ivpb -  IVPB   100 mls/hr





  BID SHAREE   Administration


 


CEFTRIAXONE 1 G/50 ML PREMIX  50 mls @ 100 mls/hr  03/03/18 11:00  03/03/18 13:

17





  Ceftriaxone 1 Gm-D5w Bag  IVPB   100 mls/hr





  DAILY SHAREE   Administration


 


Metronidazole  500 mg in 100 mls @ 100 mls/hr  03/03/18 11:00  03/04/18 01:09





  Flagyl 500mg Premixed Ivpb -  IVPB   100 mls/hr





  Q8H-IV SHAREE   Administration


 


Dextrose/Sodium Chloride  1,000 mls @ 100 mls/hr  03/03/18 13:40  03/04/18 01:10





  D5-Ns -  IV   100 mls/hr





  ASDIR SHAREE   Administration


 


Diltiazem HCl 125 mg/ Dextrose  125 mls @ 5 mls/hr  03/04/18 08:00  





  IVPB   





  TITR SHAREE   





  Protocol   





  5 MG/HR   


 


Morphine Sulfate  1 mg  03/03/18 09:14  03/03/18 11:37





  Morphine Injection -  IM   1 mg





  Q6H PRN   Administration





  PAIN LEVEL 6-10   


 


Promethazine HCl  12.5 mg  03/03/18 09:12  03/03/18 17:34





  Phenergan Injection -  IVPB   12.5 mg





  Q6H PRN   Administration





  NAUSEA AND/OR VOMITING   








 Laboratory Results - last 24 hr











  03/03/18 03/03/18 03/03/18





  09:05 09:05 09:05


 


WBC   


 


RBC   


 


Hgb   


 


Hct   


 


MCV   


 


MCH   


 


MCHC   


 


RDW   


 


Plt Count   


 


MPV   


 


Neutrophils %   


 


Lymphocytes %   


 


Monocytes %   


 


Eosinophils %   


 


Basophils %   


 


D-Dimer  602 H  


 


Sodium   140 


 


Potassium   4.3 


 


Chloride   108 H 


 


Carbon Dioxide   26 


 


Anion Gap   6 L 


 


BUN   11 


 


Creatinine   0.6 


 


Creat Clearance w eGFR   > 60 


 


Random Glucose   109 H 


 


Calcium   8.1 L 


 


Phosphorus   3.5 


 


Magnesium   2.1 


 


Total Bilirubin   0.6  D 


 


AST   27 


 


ALT   49 


 


Alkaline Phosphatase   126 H 


 


Troponin I    0.06 H


 


Total Protein   6.8 


 


Albumin   3.4 


 


Triglycerides   51 


 


Cholesterol   155 


 


Total LDL Cholesterol   87 


 


HDL Cholesterol   62 H 


 


Total Amylase    57


 


Lipase    72 L


 


Urine Color   


 


Urine Appearance   


 


Urine pH   


 


Ur Specific Gravity   


 


Urine Protein   


 


Urine Glucose (UA)   


 


Urine Ketones   


 


Urine Blood   


 


Urine Nitrite   


 


Urine Bilirubin   


 


Urine Urobilinogen   


 


Ur Leukocyte Esterase   


 


Urine WBC (Auto)   


 


Urine RBC (Auto)   


 


Ur Epithelial Cells   


 


Urine Mucus   














  03/03/18 03/04/18





  09:30 06:20


 


WBC   9.8  D


 


RBC   3.56 L


 


Hgb   11.4


 


Hct   33.0


 


MCV   92.6


 


MCH   31.9


 


MCHC   34.4


 


RDW   13.8


 


Plt Count   223


 


MPV   9.1


 


Neutrophils %   77.2


 


Lymphocytes %   13.0  D


 


Monocytes %   9.6  D


 


Eosinophils %   0.1  D


 


Basophils %   0.1


 


D-Dimer  


 


Sodium  


 


Potassium  


 


Chloride  


 


Carbon Dioxide  


 


Anion Gap  


 


BUN  


 


Creatinine  


 


Creat Clearance w eGFR  


 


Random Glucose  


 


Calcium  


 


Phosphorus  


 


Magnesium  


 


Total Bilirubin  


 


AST  


 


ALT  


 


Alkaline Phosphatase  


 


Troponin I  


 


Total Protein  


 


Albumin  


 


Triglycerides  


 


Cholesterol  


 


Total LDL Cholesterol  


 


HDL Cholesterol  


 


Total Amylase  


 


Lipase  


 


Urine Color  Yellow 


 


Urine Appearance  Clear 


 


Urine pH  6.0 


 


Ur Specific Gravity  1.017 


 


Urine Protein  Negative 


 


Urine Glucose (UA)  Negative 


 


Urine Ketones  Negative 


 


Urine Blood  2+ H 


 


Urine Nitrite  Negative 


 


Urine Bilirubin  Negative 


 


Urine Urobilinogen  Negative 


 


Ur Leukocyte Esterase  Negative 


 


Urine WBC (Auto)  8 


 


Urine RBC (Auto)  12 


 


Ur Epithelial Cells  Rare 


 


Urine Mucus  Many 








MRCP done, report pending (radiology called)


EKG: afib with RVR 150s, T inversions in with < 1 mm ST depressions in V3-V6, T 

inversion in II


repeat EKG (after metoprolol 5 mg IV and cardizem 10 mg IV) Afib 80s, otherwise 

unchanged





ASSESSMENT AND PLAN:


57 yof admitted with chest/abdominal pain, now with rapid Afib with RVR.





-New onset rapid Afib with RVR


-Epigastric/Lower sternal pain, ?symptomatic gall bladder disease, vs atypical 

cardiac symptoms, r/o PE


-SIRS (fevers, tachycardia and elevated WBC on admission)





Plan:


Rate improved with lopressor 5 mg IV and cardizem 10 mg IV, still Afib. 


Cardizem drip if recurrent tachycardia. 


Start cardizem PO 30 mg q6h. 


Check TSH, Troponin.


Given tachycardia, with ?pleuritic chest pain and borderline d-dimer, CTA chest.


Called placed out cardiology, Dr. Do covering, await call back. 


Start anti-coagulation pending above and FOBT. 


Radiology called to retrive MRI results, will follow up.


COntinue NPO, ceftriaxone/flagyl day 2. Surgery input noted, continue to 

monitor. 


IV PPI, supportive treatment. 


Plan to transfer to [facility] as needs cardizem drip and closer monitoring. 


Plan discussed with patient and nursing.


Total critical care time spent at bedside 35 min.

## 2018-03-04 NOTE — PN
Progress Note, Physician


Chief Complaint: 





Rapid Afib-fairly asymptomatic with mild dyspnea and dizziness. On diltiazem 

drip had pauses of 4 sec. She converted to NSR.


Still has epigastric pain which improves when sitting up.


History of Present Illness: 


56 y/o female with no significant past medical history admitted with constant 

chest pain which started yesterday, constant, pressure type, with radiation to 

the shoulders. Pain increases with breathing, and when she touch her lower part 

of sternum, pain also increases when she leans forward.  No diaphoresis, 

palpitations, dizziness, lightheadedness. Denies othopnea, paroxysmal dyspnea. 

ECG was normal. Pain got better after  getting morphine in ED She has minimally 

elevated TP-I and elevated DDimer





- Current Medication List


Current Medications: 


Active Medications





Acetaminophen (Tylenol -)  650 mg PO Q6H PRN


   PRN Reason: PAIN LEVEL 1-5


   Last Admin: 03/03/18 17:11 Dose:  650 mg


Atropine Sulfate (Atropine Injection -)  0.4 mg IVPUSH ONCE PRN


   PRN Reason: ASTHMA


Enoxaparin Sodium (Lovenox -)  67 mg SQ ONCE [facility]


   Last Admin: 03/04/18 14:26 Dose:  Not Given


Famotidine/Sodium Chloride (Pepcid 20 Mg Premixed Ivpb -)  20 mg in 50 mls @ 

100 mls/hr IVPB BID [facility]


   Last Admin: 03/04/18 09:36 Dose:  100 mls/hr


CEFTRIAXONE 1 G/50 ML PREMIX (Ceftriaxone 1 Gm-D5w Bag)  50 mls @ 100 mls/hr 

IVPB DAILY [facility]


   Last Admin: 03/04/18 09:36 Dose:  100 mls/hr


Metronidazole (Flagyl 500mg Premixed Ivpb -)  500 mg in 100 mls @ 100 mls/hr 

IVPB Q8H-IV SHAREE


   Last Admin: 03/04/18 09:36 Dose:  100 mls/hr


Dextrose/Sodium Chloride (D5-Ns -)  1,000 mls @ 100 mls/hr IV ASDIR [facility]


   Last Admin: 03/04/18 01:10 Dose:  100 mls/hr


Diltiazem HCl 125 mg/ Dextrose  125 mls @ 5 mls/hr IVPB TITR SHAREE; 5 MG/HR


   PRN Reason: Protocol


   Last Titration: 03/04/18 11:00 Dose:  15 mg/hr, 15 mls/hr


Morphine Sulfate (Morphine Injection -)  1 mg IM Q6H PRN


   PRN Reason: PAIN LEVEL 6-10


   Last Admin: 03/03/18 11:37 Dose:  1 mg


Promethazine HCl (Phenergan Injection -)  12.5 mg IVPB Q6H PRN


   PRN Reason: NAUSEA AND/OR VOMITING


   Last Admin: 03/03/18 17:34 Dose:  12.5 mg











- Objective


Vital Signs: 


 Vital Signs











Temperature  98.9 F   03/04/18 06:00


 


Pulse Rate  170 H  03/04/18 11:00


 


Respiratory Rate  18 03/04/18 08:26


 


Blood Pressure  106/74   03/04/18 11:00


 


O2 Sat by Pulse Oximetry (%)  94 L  03/03/18 20:16











Constitutional: Yes: Well Nourished, No Distress, Calm


Eyes: Yes: Conjunctiva Clear, EOM Intact


HENT: Yes: Atraumatic, Normocephalic


Neck: Yes: Supple, Trachea Midline


Cardiovascular: Yes: Regular Rate and Rhythm


Respiratory: Yes: Regular, CTA Bilaterally


Gastrointestinal: Yes: Normal Bowel Sounds, Soft


Edema: No


Peripheral Pulses WNL: Yes


Labs: 


 CBC, BMP





 03/04/18 06:20 





 03/04/18 06:20 





 INR, PTT











INR  1.10  (0.82-1.09)   03/03/18  04:29    














- ....Imaging


Cat Scan: Report Reviewed





Problem List





- Problems


(1) Chest pain


Code(s): R07.9 - CHEST PAIN, UNSPECIFIED   


Qualifiers: 


   Chest pain type: unspecified   Qualified Code(s): R07.9 - Chest pain, 

unspecified   





(2) Afib


Code(s): I48.91 - UNSPECIFIED ATRIAL FIBRILLATION   





Assessment/Plan





57 F with reporducible CP improved when sitting with mildly elevated TP-I and 

Ddimer level, leukocytosis. SHe had transient rapid afib with tachybrady and 

converted to NSR.





CT chest shows pericardial effusion, possible PNA





1. Clinical picture consistent with pericarditis.





-Check CRP and ESR


-Correct hypokalemia


-Add Colchecine 0.6 bid 


-Add Ibuprofen 600mg TID


-Echocardiogram to better assess LV function and effusion


-No need for stress test. Minimal TP-i elevation seen with pericardial 

inflammation.





2. Transient Afib with tachy-david now converted to NSR


likely from infection/pericarditis





-systemic AC is not needed and possibly contraindicated. Low CHADS-VAsc score 

and brief duration of AF also.


-If AF recurs can consider Amiodarone


-DVT prophylaxis with LMWH or UFH is OK

## 2018-03-05 LAB
ALBUMIN SERPL-MCNC: 2.7 G/DL (ref 3.4–5)
ALP SERPL-CCNC: 88 U/L (ref 45–117)
ALT SERPL-CCNC: 26 U/L (ref 12–78)
ANION GAP SERPL CALC-SCNC: 7 MMOL/L (ref 8–16)
AST SERPL-CCNC: 11 U/L (ref 15–37)
BASOPHILS # BLD: 0.4 % (ref 0–2)
BILIRUB SERPL-MCNC: 0.5 MG/DL (ref 0.2–1)
BUN SERPL-MCNC: 11 MG/DL (ref 7–18)
CALCIUM SERPL-MCNC: 7.6 MG/DL (ref 8.5–10.1)
CHLORIDE SERPL-SCNC: 111 MMOL/L (ref 98–107)
CO2 SERPL-SCNC: 24 MMOL/L (ref 21–32)
CREAT SERPL-MCNC: 0.5 MG/DL (ref 0.55–1.02)
DEPRECATED RDW RBC AUTO: 13.7 % (ref 11.6–15.6)
EOSINOPHIL # BLD: 0.9 % (ref 0–4.5)
GLUCOSE SERPL-MCNC: 124 MG/DL (ref 74–106)
HCT VFR BLD CALC: 31.6 % (ref 32.4–45.2)
HGB BLD-MCNC: 10.7 GM/DL (ref 10.7–15.3)
LYMPHOCYTES # BLD: 12.5 % (ref 8–40)
MAGNESIUM SERPL-MCNC: 2.1 MG/DL (ref 1.8–2.4)
MCH RBC QN AUTO: 31.6 PG (ref 25.7–33.7)
MCHC RBC AUTO-ENTMCNC: 33.9 G/DL (ref 32–36)
MCV RBC: 93 FL (ref 80–96)
MONOCYTES # BLD AUTO: 8.1 % (ref 3.8–10.2)
NEUTROPHILS # BLD: 78.1 % (ref 42.8–82.8)
PHOSPHATE SERPL-MCNC: 1.7 MG/DL (ref 2.5–4.9)
PLATELET # BLD AUTO: 225 K/MM3 (ref 134–434)
PMV BLD: 9.1 FL (ref 7.5–11.1)
POTASSIUM SERPLBLD-SCNC: 3.2 MMOL/L (ref 3.5–5.1)
PROT SERPL-MCNC: 5.8 G/DL (ref 6.4–8.2)
RBC # BLD AUTO: 3.39 M/MM3 (ref 3.6–5.2)
SODIUM SERPL-SCNC: 142 MMOL/L (ref 136–145)
WBC # BLD AUTO: 8.6 K/MM3 (ref 4–10)

## 2018-03-05 RX ADMIN — IBUPROFEN SCH: 600 TABLET, FILM COATED ORAL at 22:59

## 2018-03-05 RX ADMIN — COLCHICINE SCH MG: 0.6 CAPSULE ORAL at 10:10

## 2018-03-05 RX ADMIN — PROMETHAZINE HYDROCHLORIDE PRN MG: 25 INJECTION INTRAMUSCULAR; INTRAVENOUS at 12:50

## 2018-03-05 RX ADMIN — COLCHICINE SCH: 0.6 CAPSULE ORAL at 11:07

## 2018-03-05 RX ADMIN — ACETAMINOPHEN PRN MG: 325 TABLET ORAL at 20:43

## 2018-03-05 RX ADMIN — POTASSIUM & SODIUM PHOSPHATES POWDER PACK 280-160-250 MG SCH PACKET: 280-160-250 PACK at 18:28

## 2018-03-05 RX ADMIN — DEXTROSE MONOHYDRATE SCH MLS/HR: 50 INJECTION, SOLUTION INTRAVENOUS at 14:36

## 2018-03-05 RX ADMIN — POTASSIUM CHLORIDE SCH MLS/HR: 149 INJECTION, SOLUTION, CONCENTRATE INTRAVENOUS at 18:28

## 2018-03-05 RX ADMIN — IBUPROFEN SCH MG: 600 TABLET, FILM COATED ORAL at 13:54

## 2018-03-05 RX ADMIN — METOPROLOL TARTRATE SCH MG: 25 TABLET, FILM COATED ORAL at 21:59

## 2018-03-05 RX ADMIN — DEXTROSE MONOHYDRATE SCH MLS/HR: 50 INJECTION, SOLUTION INTRAVENOUS at 22:37

## 2018-03-05 RX ADMIN — IBUPROFEN SCH MG: 600 TABLET, FILM COATED ORAL at 05:48

## 2018-03-05 RX ADMIN — ACETAMINOPHEN PRN MG: 325 TABLET ORAL at 06:54

## 2018-03-05 RX ADMIN — POTASSIUM CHLORIDE SCH MLS/HR: 149 INJECTION, SOLUTION, CONCENTRATE INTRAVENOUS at 13:44

## 2018-03-05 RX ADMIN — FAMOTIDINE SCH MLS/HR: 20 INJECTION, SOLUTION INTRAVENOUS at 21:59

## 2018-03-05 RX ADMIN — CEFTRIAXONE SCH MLS/HR: 1 INJECTION, SOLUTION INTRAVENOUS at 10:09

## 2018-03-05 RX ADMIN — FAMOTIDINE SCH MLS/HR: 20 INJECTION, SOLUTION INTRAVENOUS at 10:09

## 2018-03-05 RX ADMIN — ENOXAPARIN SODIUM SCH MG: 40 INJECTION SUBCUTANEOUS at 10:09

## 2018-03-05 NOTE — PN
Teaching Attending Note


Name of Resident: Sherry Chatman





ATTENDING PHYSICIAN STATEMENT


Time of evaluation: 10:05 AM


I saw and evaluated the patient.


I reviewed the resident's note and discussed the case with the resident.


I agree with the resident's findings and plan as documented with exceptions 

below.








SUBJECTIVE:


Patient seen and examined. reported some nausea and headache earlier, no chest 

or abdominal pain, palpitations or dizziness. 





OBJECTIVE:


 Vital Signs











 Period  Temp  Pulse  Resp  BP Sys/Nunez  Pulse Ox


 


 Last 24 Hr  98.5 F-100.2 F    18-20  104-128/44-93  95-98








 Intake & Output











 03/02/18 03/03/18 03/04/18 03/05/18





 23:59 23:59 23:59 23:59


 


Intake Total  1070 1290 1450


 


Balance  1070 1290 1450


 


Weight  146 lb  146 lb 3.2 oz








general: sitting in bed in no acute distress


CVS: S1S2 regular


Chest: CTAB, no rales or wheezing, no sternal or epigastric tenderness today


Abdomen: soft, NT, ND, positive bowel sounds, neg White's sign, no voluntary 

or involuntary guarding or rigidity


extremities: no edema





 Home Medication List











 Medication  Instructions  Recorded  Confirmed  Type


 


NK [No Known Home Medication]  03/03/18 03/03/18 History








 Active Medications











Generic Name Dose Route Start Last Admin





  Trade Name Demianq  PRN Reason Stop Dose Admin


 


Acetaminophen  650 mg  03/03/18 09:16  03/05/18 06:54





  Tylenol -  PO   650 mg





  Q6H PRN   Administration





  PAIN LEVEL 1-5   


 


Atropine Sulfate  0.4 mg  03/04/18 12:09  





  Atropine Injection -  IVPUSH   





  ONCE PRN   





  ASTHMA   


 


Colchicine  0.6 mg  03/04/18 22:00  03/04/18 21:19





  Colcrys -  PO   0.6 mg





  BID SHAREE   Administration


 


Enoxaparin Sodium  40 mg  03/05/18 10:00  





  Lovenox -  SQ   





  DAILY SHAREE   


 


Famotidine/Sodium Chloride  20 mg in 50 mls @ 100 mls/hr  03/03/18 10:00  03/04/ 18 21:20





  Pepcid 20 Mg Premixed Ivpb -  IVPB   100 mls/hr





  BID SHAREE   Administration


 


CEFTRIAXONE 1 G/50 ML PREMIX  50 mls @ 100 mls/hr  03/03/18 11:00  03/04/18 09:

36





  Ceftriaxone 1 Gm-D5w Bag  IVPB   100 mls/hr





  DAILY SHAREE   Administration


 


Metronidazole  500 mg in 100 mls @ 100 mls/hr  03/03/18 11:00  03/05/18 02:55





  Flagyl 500mg Premixed Ivpb -  IVPB   100 mls/hr





  Q8H-IV SHAREE   Administration


 


Dextrose/Sodium Chloride  1,000 mls @ 100 mls/hr  03/03/18 13:40  03/04/18 15:17





  D5-Ns -  IV   100 mls/hr





  ASDIR SHAREE   Administration


 


Potassium Chloride 10 meq/  105 mls @ 100 mls/hr  03/05/18 09:00  





  Sodium Chloride  IVPB  03/05/18 10:59  





  Q60M SHAREE   


 


Sodium Phosphate 30 mm/ Sodium  260 mls @ 62.5 mls/hr  03/05/18 08:13  





  Chloride  IVPB  03/05/18 12:22  





  ONCE ONE   


 


Ibuprofen  600 mg  03/04/18 14:45  03/05/18 05:48





  Motrin -  PO   600 mg





  TID SHAREE   Administration


 


Promethazine HCl  12.5 mg  03/03/18 09:12  03/03/18 17:34





  Phenergan Injection -  IVPB   12.5 mg





  Q6H PRN   Administration





  NAUSEA AND/OR VOMITING   








 Laboratory Results - last 24 hr











  03/04/18 03/04/18 03/04/18





  06:20 08:25 15:21


 


Sodium  143  


 


Potassium  3.3 L  


 


Chloride  111 H  


 


Carbon Dioxide  23  


 


Anion Gap  9  


 


BUN  8  


 


Creatinine  0.5 L  


 


Creat Clearance w eGFR  > 60  


 


Random Glucose  121 H  


 


Calcium  7.6 L  


 


Phosphorus   


 


Magnesium   


 


Total Bilirubin  0.5  


 


AST  16  


 


ALT  38  


 


Alkaline Phosphatase  98  


 


Troponin I  0.02  Cancelled 


 


C-Reactive Protein    12.8 H


 


Total Protein  6.3 L  


 


Albumin  3.0 L  


 


TSH  0.67  Cancelled 














  03/05/18





  06:45


 


Sodium  142


 


Potassium  3.2 L


 


Chloride  111 H


 


Carbon Dioxide  24


 


Anion Gap  7 L


 


BUN  11


 


Creatinine  0.5 L


 


Creat Clearance w eGFR  > 60


 


Random Glucose  124 H


 


Calcium  7.6 L


 


Phosphorus  1.7 L


 


Magnesium  2.1


 


Total Bilirubin  0.5


 


AST  11 L


 


ALT  26


 


Alkaline Phosphatase  88


 


Troponin I 


 


C-Reactive Protein 


 


Total Protein  5.8 L


 


Albumin  2.7 L


 


TSH 








 Microbiology





03/03/18 21:00   Blood - Peripheral Venous   Blood Culture - Preliminary


                            NO GROWTH OBTAINED AFTER 24 HOURS, INCUBATION TO 

CONTINUE


                            FOR 4 DAYS.


03/03/18 20:40   Blood - Peripheral Venous   Blood Culture - Preliminary


                            NO GROWTH OBTAINED AFTER 24 HOURS, INCUBATION TO 

CONTINUE


                            FOR 4 DAYS.











ASSESSMENT AND PLAN:


57 yof admitted with chest/abdominal pain, now with rapid Afib with RVR.





-New onset rapid Afib with RVR with pauses


-Epigastric/Lower sternal pain, ?acute pericarditis/Pericardial effusion, vs 

symptomatic GB disease (less likely), PE ruled out


-SIRS (fevers, tachycardia and elevated WBC on admission), resolved. 





Plan: 


reverted to NSR on 3/4. 


Cardiology input appreciated. Follow up 2D echo, hold off on anti-coagulation 

for now. 


ACS ruled out, TSH normal.


CT PE neg.


Showing Pericardial effusion, ?presenting symptoms from Acute pericarditis. 


Started on colchicine/Indomethacin, will monitor. 


Abdominal US noted, Surgery input appreciated. 


MRCP results noted, patient with nausea and vague abdominal symptoms.GI consult 

with Dr. Lockwood. 


D/c IV antibiotics and fluids. 


PO as tolerated. 


IV PPI, supportive treatment. 


Dispo planning pending 2D echo, GI and cardiology input.


PLan discussed with patient and all questions answered.

## 2018-03-05 NOTE — EKG
Test Reason : 

Blood Pressure : ***/*** mmHG

Vent. Rate : 124 BPM     Atrial Rate : 097 BPM

   P-R Int : 000 ms          QRS Dur : 084 ms

    QT Int : 314 ms       P-R-T Axes : 000 021 -15 degrees

   QTc Int : 451 ms

 

ATRIAL FIBRILLATION WITH RAPID VENTRICULAR RESPONSE WITH PREMATURE

VENTRICULAR OR ABERRANTLY CONDUCTED COMPLEXES

ABNORMAL ECG

WHEN COMPARED WITH ECG OF 04-MAR-2018 07:47,

NO SIGNIFICANT CHANGE WAS FOUND

Confirmed by JEIMY YE MD ([id number]) on 3/5/2018 10:46:54 AM

 

Referred By:             Confirmed By:JEIMY YE MD

## 2018-03-05 NOTE — PN
Progress Note, Physician


Chief Complaint: 





Chest pain


History of Present Illness: 





57-year-old female, admitted with chest pains, rapid atrial fibrillation, 

likely pericarditis.





- Current Medication List


Current Medications: 


Active Medications





Acetaminophen (Tylenol -)  650 mg PO Q6H PRN


   PRN Reason: PAIN LEVEL 1-5


   Last Admin: 03/05/18 06:54 Dose:  650 mg


Atropine Sulfate (Atropine Injection -)  0.4 mg IVPUSH ONCE PRN


   PRN Reason: ASTHMA


Colchicine (Colcrys -)  0.6 mg PO DAILY [facility]


   Last Admin: 03/05/18 11:07 Dose:  Not Given


Enoxaparin Sodium (Lovenox -)  40 mg SQ DAILY [facility]


   Last Admin: 03/05/18 10:09 Dose:  40 mg


Famotidine/Sodium Chloride (Pepcid 20 Mg Premixed Ivpb -)  20 mg in 50 mls @ 

100 mls/hr IVPB BID [facility]


   Last Admin: 03/05/18 10:09 Dose:  100 mls/hr


Sodium Phosphate 30 mm/ Sodium (Chloride)  510 mls @ 85 mls/hr IVPB ONCE ONE


   Stop: 03/05/18 15:29


   Last Admin: 03/05/18 12:54 Dose:  85 mls/hr


Diltiazem HCl 125 mg/ Dextrose  125 mls @ 5 mls/hr IVPB TITR SHAREE; 5 MG/HR


   PRN Reason: Protocol


   Last Titration: 03/05/18 14:43 Dose:  10 mg/hr, 10 mls/hr


Ibuprofen (Motrin -)  600 mg PO TID [facility]


   Last Admin: 03/05/18 13:54 Dose:  600 mg


Morphine Sulfate (Morphine Sulfate)  1 mg IVPUSH Q4H PRN


   PRN Reason: PAIN LEVEL 6-10


Promethazine HCl (Phenergan Injection -)  12.5 mg IVPB Q6H PRN


   PRN Reason: NAUSEA AND/OR VOMITING


   Last Admin: 03/05/18 12:50 Dose:  12.5 mg











- Objective


Vital Signs: 


 Vital Signs











Temperature  98.9 F   03/05/18 06:00


 


Pulse Rate  163 H  03/05/18 14:43


 


Respiratory Rate  20   03/05/18 06:00


 


Blood Pressure  133/61   03/05/18 14:43


 


O2 Sat by Pulse Oximetry (%)  98   03/04/18 21:00











Constitutional: Yes: Well Nourished, No Distress, Calm


Eyes: Yes: WNL, Conjunctiva Clear, EOM Intact


HENT: Yes: WNL, Atraumatic, Normocephalic


Neck: Yes: WNL, Supple, Trachea Midline


Cardiovascular: Yes: Tachycardia, Pulse Irregular, S1, S2


Respiratory: Yes: WNL, Regular, CTA Bilaterally


Gastrointestinal: Yes: WNL, Normal Bowel Sounds, Soft


...Rectal Exam: Yes: Deferred


Musculoskeletal: Yes: WNL


Extremities: Yes: WNL


Edema: No


Peripheral Pulses WNL: Yes


Neurological: Yes: WNL, Alert, Oriented


Labs: 


 CBC, BMP





 03/05/18 06:45 





 03/05/18 06:45 





 INR, PTT











INR  1.10  (0.82-1.09)   03/03/18  04:29    














Assessment/Plan





57-year-old female presenting with chest pains, noted to be in rapid atrial 

fibrillation. Most likely has pericarditis. Had a 5 second pause prior to 

converted to sinus rhythm. She is back in rapid atrial fibrillation.





The echocardiogram showed that both ventricles are functioning normally. There 

is moderate mitral and tricuspid regurgitation and small pericardial effusion.





Would not anticoagulate in the setting of acute pericarditis.


Restart Cardizem drip. Up titrate the rate control.


Acceptable ventricular rates up to 115, in this setting.


Continue colchicine and current regimen.


The patient is stable, and minimally symptomatic.

## 2018-03-05 NOTE — EKG
Test Reason : 

Blood Pressure : ***/*** mmHG

Vent. Rate : 072 BPM     Atrial Rate : 227 BPM

   P-R Int : 000 ms          QRS Dur : 084 ms

    QT Int : 346 ms       P-R-T Axes : 000 033 000 degrees

   QTc Int : 378 ms

 

ATRIAL FIBRILLATION

NONSPECIFIC ST AND T WAVE ABNORMALITY

ABNORMAL ECG

WHEN COMPARED WITH ECG OF 04-MAR-2018 07:54,

VENT. RATE HAS DECREASED BY  52 BPM

Confirmed by JEIMY YE MD ([id number]) on 3/5/2018 10:46:46 AM

 

Referred By:             Confirmed By:JEIMY YE MD

## 2018-03-05 NOTE — EKG
Test Reason : 

Blood Pressure : ***/*** mmHG

Vent. Rate : 138 BPM     Atrial Rate : 077 BPM

   P-R Int : 000 ms          QRS Dur : 086 ms

    QT Int : 322 ms       P-R-T Axes : 000 032 003 degrees

   QTc Int : 487 ms

 

ATRIAL FIBRILLATION WITH RAPID VENTRICULAR RESPONSE WITH PREMATURE

VENTRICULAR OR ABERRANTLY CONDUCTED COMPLEXES

NONSPECIFIC ST ABNORMALITY

ABNORMAL ECG

WHEN COMPARED WITH ECG OF 04-MAR-2018 07:47,

ATRIAL FIBRILLATION HAS REPLACED SINUS RHYTHM

VENT. RATE HAS INCREASED

Confirmed by JEIMY YE MD ([id number]) on 3/5/2018 10:47:21 AM

 

Referred By:             Confirmed By:JEIMY YE MD

## 2018-03-05 NOTE — EKG
Test Reason : 

Blood Pressure : ***/*** mmHG

Vent. Rate : 093 BPM     Atrial Rate : 093 BPM

   P-R Int : 150 ms          QRS Dur : 094 ms

    QT Int : 296 ms       P-R-T Axes : 048 025 017 degrees

   QTc Int : 368 ms

 

SINUS RHYTHM WITH FREQUENT PREMATURE VENTRICULAR COMPLEXES IN A

PATTERN OF BIGEMINY

NONSPECIFIC T WAVE ABNORMALITY

ABNORMAL ECG

WHEN COMPARED WITH ECG OF 04-MAR-2018 07:37,

SINUS RHYTHM HAS REPLACED ATRIAL FIBRILLATION

VENT. RATE HAS DECREASED BY  63 BPM

PREMATURE VENTRICULAR COMPLEXES ARE SEEN

Confirmed by JEIMY YE MD ([id number]) on 3/5/2018 10:47:59 AM

 

Referred By:             Confirmed By:JEIMY YE MD

## 2018-03-05 NOTE — PN
Physical Exam: 


SUBJECTIVE: Patient seen and examined.  Pt c/o headache and nausea.  Pt denies 

abdominal pain, chest pain, sob, fever, chills.  Pt went back into rapid afib 

this afternoon, was placed on Cardizem drip, and converted back to NSR.  Pt 

asymptomatic during episode.  Pt had some pauses in heartrate overnight on 

telemetry monitor.  





OBJECTIVE:





 Vital Signs











 Period  Temp  Pulse  Resp  BP Sys/Nunez  Pulse Ox


 


 Last 24 Hr  98.6 F-99.0 F    20-20  104-133/44-84  96-98








GENERAL: The patient is awake, alert, and fully oriented, in no acute distress.


LUNGS: Breath sounds equal, clear to auscultation bilaterally, no wheezes, no 

crackles, no accessory muscle use. 


HEART: irregularly irregular, +S1/S2, no murmur appreciated.


ABDOMEN: Soft, nontender, nondistended, normoactive bowel sounds, no guarding.  

(-) White's sign.


EXTREMITIES: Warm, well-perfused, no edema. 


PSYCH: Normal mood, normal affect.


SKIN: Warm, dry, normal turgor, no rashes or lesions noted





 Laboratory Results - last 24 hr











  03/05/18 03/05/18 03/05/18





  06:45 06:45 06:45


 


WBC   8.6 


 


RBC   3.39 L 


 


Hgb   10.7 


 


Hct   31.6 L 


 


MCV   93.0 


 


MCH   31.6 


 


MCHC   33.9 


 


RDW   13.7 


 


Plt Count   225 


 


MPV   9.1 


 


Neutrophils %   78.1 


 


Lymphocytes %   12.5 


 


Monocytes %   8.1 


 


Eosinophils %   0.9  D 


 


Basophils %   0.4  D 


 


ESR  51 H  


 


Sodium    142


 


Potassium    3.2 L


 


Chloride    111 H


 


Carbon Dioxide    24


 


Anion Gap    7 L


 


BUN    11


 


Creatinine    0.5 L


 


Creat Clearance w eGFR    > 60


 


Random Glucose    124 H


 


Calcium    7.6 L


 


Phosphorus    1.7 L


 


Magnesium    2.1


 


Total Bilirubin    0.5


 


AST    11 L


 


ALT    26


 


Alkaline Phosphatase    88


 


Total Protein    5.8 L


 


Albumin    2.7 L








Active Medications











Generic Name Dose Route Start Last Admin





  Trade Name Freq  PRN Reason Stop Dose Admin


 


Acetaminophen  650 mg  03/03/18 09:16  03/05/18 06:54





  Tylenol -  PO   650 mg





  Q6H PRN   Administration





  PAIN LEVEL 1-5   


 


Atropine Sulfate  0.4 mg  03/04/18 12:09  





  Atropine Injection -  IVPUSH   





  ONCE PRN   





  ASTHMA   


 


Colchicine  0.6 mg  03/05/18 10:30  03/05/18 11:07





  Colcrys -  PO   Not Given





  DAILY SHAREE   


 


Enoxaparin Sodium  40 mg  03/05/18 10:00  03/05/18 10:09





  Lovenox -  SQ   40 mg





  DAILY SHAREE   Administration


 


Famotidine/Sodium Chloride  20 mg in 50 mls @ 100 mls/hr  03/03/18 10:00  03/05/ 18 10:09





  Pepcid 20 Mg Premixed Ivpb -  IVPB   100 mls/hr





  BID SHAREE   Administration


 


Diltiazem HCl 125 mg/ Dextrose  125 mls @ 5 mls/hr  03/05/18 13:30  03/05/18 14:

43





  IVPB   10 mg/hr





  TITR SHAREE   10 mls/hr





  Protocol   Titration





  5 MG/HR   


 


Ibuprofen  600 mg  03/04/18 14:45  03/05/18 13:54





  Motrin -  PO   600 mg





  TID SHAREE   Administration


 


Morphine Sulfate  1 mg  03/05/18 13:25  





  Morphine Sulfate  IVPUSH   





  Q4H PRN   





  PAIN LEVEL 6-10   


 


Promethazine HCl  12.5 mg  03/03/18 09:12  03/05/18 12:50





  Phenergan Injection -  IVPB   12.5 mg





  Q6H PRN   Administration





  NAUSEA AND/OR VOMITING   








IMAGING:


3/3/18 MRCP -> cholelithiasis sans evidence of cholecystitis.  Nonspecific mild 

periportal edema 2/2 overhydration or early inflammatory process (likely 

cholecystitis or hepatitis).  Significant mayela basilar atelectatic changes, 

underlying infiltrate cannot be ruled out.  Nonspecific area of subcutaneous 

edema in anterolateral Left abdomen, could be post-traumatic vs 2/2 underlying 

infection.


3/3/18 CXR -> basilar infiltrate may be present on Left.  Some increased 

central markings.


3/3/18 Ab US -> mild fatty infiltrate of liver.  Small gallstones in neck of 

gallbladder, with largest measuring 1.1cm, borderline thickening of gallbladder 

wall sans evidence of pericholecystic free fluid.  (+) sono White's sign.


3/3/18 CXR 8pm -> may be some mild atelectatic changes at bases.


3/4/18 CTA -> no PE.  Mild bibasilar atelectatic changes and probably 

infiltrates with minimal mayela pleural effusion.  Pericardial effusion of 1.5cm 

width noted along Left inferior margin.


3/5/18 Echo -> LVEF 65-70%.  Left ventricle normal in size and systolic 

function.  Right ventricle systolic function normal.  No regional wall motion 

abnormalities.  Moderate MR/TR.  Small pericardial effusion, < 1cm.  





ASSESSMENT/PLAN:


57F with no significant PMH, presents with chest pain, found to have new onset 

afib.





# new onset afib with RVR 


   - CHADSVASc Score = 1 (for gender), anticoagulation not necessary


   - Cardizem being tapered down as pt is now converted to NSR


   - Lopressor 25mg BID added





# chest pain - likely 2/2 pericarditis


   - Cardiology (Dr. Gonzales) recs appreciated: continue Colchicine and 

Ibuprofen





# ?epigastric/RUQ pain


   - GI (Dr. Lockwood) recs appreciated: f/u bloodwork


   - antibiotics D/Avinash as cholecystitis unlikely based on MRCP read





# electrolyte abnormalities


   - hypokalemia and hypophosphatemia repleted with potassium phosphate IVPB


   


# FEN


   - Fluids: po


   - Electrolytes: continue to monitor


   - Nutrition: clear liquids





# Prophylaxis


   - DVT ppx with Lovenox SQ


   - GI ppx with Famotidine 





Visit type





- Emergency Visit


Emergency Visit: Yes


ED Registration Date: 03/03/18


Care time: The patient presented to the Emergency Department on the above date 

and was hospitalized for further evaluation of their emergent condition.





- New Patient


This patient is new to me today: Yes


Date on this admission: 03/05/18





- Critical Care


Critical Care patient: No

## 2018-03-05 NOTE — CON.GI
Consult


Consult Specialty:: GI


Reason for Consultation:: Abnrmal imaging of the liver, epigastric pain





- History of Present Illness


History of Present Illness: 





Chart reviewed. Events noted. The patient's significant other and her nurse 

were interpreting.





Per ED intake: 57-year-old female no medical history presents with chest 

discomfort. The patient was fine yesterday, went to bed and then woke up with 

reproducible midsternal chest pain and epigastric discomfort. States radiating 

to her throat and makes her feel short of breath. Reports that the pain 

radiates to the back. Reports nausea but denies vomiting. Denies fevers or 

chills. Does not think any particular foods were causing the symptoms.





Subsequently, the patient was found to have small pericardial effusion and 

cholelithiasis with normal transaminases, ALP and bili. She developed atrial 

fibrillation with RVR and long poses while on diltiazem.





The patient reports no prior history of GI/liver issues s/a hepatitis, jaundice

, ulcers, gastritis, reflux, melena, hematochezia, hematemesis. Reports no 

significant weight loss in the past 12 months. Has non-contributory family 

history





at the time of this encounter pt reports feeling nauseous 





- History Source


History Provided By: Patient, Significant Other, Medical Record, Caregiver


Limitations to Obtaining History: No Limitations





- Alcohol/Substance Use


Hx Alcohol Use: No





- Smoking History


Smoking history: Never smoked


Have you smoked in the past 12 months: No





Home Medications





- Allergies


Allergies/Adverse Reactions: 


 Allergies











Allergy/AdvReac Type Severity Reaction Status Date / Time


 


No Known Allergies Allergy   Verified 03/03/18 02:48














- Home Medications


Home Medications: 


Ambulatory Orders





NK [No Known Home Medication]  03/03/18 











Family Disease History





- Family Disease History


Family History: Unremarkable





Review of Systems


Findings/Remarks: 





as per PPI, H&P





Physical Exam-GI


Vital Signs: 


 Vital Signs











Temperature  98.9 F   03/05/18 06:00


 


Pulse Rate  58 L  03/05/18 06:00


 


Respiratory Rate  20   03/05/18 06:00


 


Blood Pressure  104/58   03/05/18 06:00


 


O2 Sat by Pulse Oximetry (%)  98   03/04/18 21:00











Constitutional: Yes: Well Nourished


Cardiovascular: Yes: Tachycardia, Pulse Irregular


Respiratory: Yes: Regular


Gastrointestinal Inspection: No: Distention


...Palpate: Yes: Soft, Other (nausea).  No: Firm/Rigid, Guarding


Edema: No


Neurological: Yes: Alert, Oriented


Labs: 


 CBC, BMP





 03/05/18 06:45 





 03/05/18 06:45 





 INR, PTT











INR  1.10  (0.82-1.09)   03/03/18  04:29    








 Laboratory Tests











  03/03/18 03/03/18 03/03/18





  03:23 04:29 04:29


 


WBC   14.7 H 


 


RBC   4.00 


 


Hgb   12.5 


 


Hct   37.2 


 


MCV   92.9 


 


MCH   31.3 


 


MCHC   33.7 


 


RDW   13.5 


 


Plt Count   264 


 


MPV   8.8 


 


Neutrophils %   92.2 H 


 


Lymphocytes %   3.5 L 


 


Monocytes %   4.0 


 


Eosinophils %   0.0 


 


Basophils %   0.3 


 


ESR   


 


PT with INR   


 


INR   


 


PTT (Actin FS)   


 


D-Dimer   


 


Sodium    139


 


Potassium    3.3 L


 


Chloride    104


 


Carbon Dioxide    23


 


Anion Gap    12


 


BUN    12


 


Creatinine    0.5 L


 


Creat Clearance w eGFR    > 60


 


Random Glucose    150 H


 


Calcium    8.3 L


 


Phosphorus   


 


Magnesium   


 


Total Bilirubin    0.4


 


AST    31


 


ALT    51


 


Alkaline Phosphatase    139 H


 


Creatine Kinase  93  


 


Troponin I  0.11 H  


 


C-Reactive Protein   


 


Total Protein    7.5


 


Albumin    3.8


 


Triglycerides   


 


Cholesterol   


 


Total LDL Cholesterol   


 


HDL Cholesterol   


 


Total Amylase   


 


Lipase   


 


TSH   


 


Urine Color   


 


Urine Appearance   


 


Urine pH   


 


Ur Specific Gravity   


 


Urine Protein   


 


Urine Glucose (UA)   


 


Urine Ketones   


 


Urine Blood   


 


Urine Nitrite   


 


Urine Bilirubin   


 


Urine Urobilinogen   


 


Ur Leukocyte Esterase   


 


Urine WBC (Auto)   


 


Urine RBC (Auto)   


 


Ur Epithelial Cells   


 


Urine Mucus   














  03/03/18 03/03/18 03/03/18





  04:29 09:05 09:05


 


WBC   


 


RBC   


 


Hgb   


 


Hct   


 


MCV   


 


MCH   


 


MCHC   


 


RDW   


 


Plt Count   


 


MPV   


 


Neutrophils %   


 


Lymphocytes %   


 


Monocytes %   


 


Eosinophils %   


 


Basophils %   


 


ESR   


 


PT with INR  12.40 H  


 


INR  1.10  


 


PTT (Actin FS)  32.6  


 


D-Dimer   602 H 


 


Sodium    140


 


Potassium    4.3


 


Chloride    108 H


 


Carbon Dioxide    26


 


Anion Gap    6 L


 


BUN    11


 


Creatinine    0.6


 


Creat Clearance w eGFR    > 60


 


Random Glucose    109 H


 


Calcium    8.1 L


 


Phosphorus    3.5


 


Magnesium    2.1


 


Total Bilirubin    0.6  D


 


AST    27


 


ALT    49


 


Alkaline Phosphatase    126 H


 


Creatine Kinase   


 


Troponin I   


 


C-Reactive Protein   


 


Total Protein    6.8


 


Albumin    3.4


 


Triglycerides    51


 


Cholesterol    155


 


Total LDL Cholesterol    87


 


HDL Cholesterol    62 H


 


Total Amylase   


 


Lipase   


 


TSH   


 


Urine Color   


 


Urine Appearance   


 


Urine pH   


 


Ur Specific Gravity   


 


Urine Protein   


 


Urine Glucose (UA)   


 


Urine Ketones   


 


Urine Blood   


 


Urine Nitrite   


 


Urine Bilirubin   


 


Urine Urobilinogen   


 


Ur Leukocyte Esterase   


 


Urine WBC (Auto)   


 


Urine RBC (Auto)   


 


Ur Epithelial Cells   


 


Urine Mucus   














  03/03/18 03/03/18 03/04/18





  09:05 09:30 06:20


 


WBC    9.8  D


 


RBC    3.56 L


 


Hgb    11.4


 


Hct    33.0


 


MCV    92.6


 


MCH    31.9


 


MCHC    34.4


 


RDW    13.8


 


Plt Count    223


 


MPV    9.1


 


Neutrophils %    77.2


 


Lymphocytes %    13.0  D


 


Monocytes %    9.6  D


 


Eosinophils %    0.1  D


 


Basophils %    0.1


 


ESR   


 


PT with INR   


 


INR   


 


PTT (Actin FS)   


 


D-Dimer   


 


Sodium   


 


Potassium   


 


Chloride   


 


Carbon Dioxide   


 


Anion Gap   


 


BUN   


 


Creatinine   


 


Creat Clearance w eGFR   


 


Random Glucose   


 


Calcium   


 


Phosphorus   


 


Magnesium   


 


Total Bilirubin   


 


AST   


 


ALT   


 


Alkaline Phosphatase   


 


Creatine Kinase   


 


Troponin I  0.06 H  


 


C-Reactive Protein   


 


Total Protein   


 


Albumin   


 


Triglycerides   


 


Cholesterol   


 


Total LDL Cholesterol   


 


HDL Cholesterol   


 


Total Amylase  57  


 


Lipase  72 L  


 


TSH   


 


Urine Color   Yellow 


 


Urine Appearance   Clear 


 


Urine pH   6.0 


 


Ur Specific Gravity   1.017 


 


Urine Protein   Negative 


 


Urine Glucose (UA)   Negative 


 


Urine Ketones   Negative 


 


Urine Blood   2+ H 


 


Urine Nitrite   Negative 


 


Urine Bilirubin   Negative 


 


Urine Urobilinogen   Negative 


 


Ur Leukocyte Esterase   Negative 


 


Urine WBC (Auto)   8 


 


Urine RBC (Auto)   12 


 


Ur Epithelial Cells   Rare 


 


Urine Mucus   Many 














  03/04/18 03/04/18 03/04/18





  06:20 08:25 15:21


 


WBC   


 


RBC   


 


Hgb   


 


Hct   


 


MCV   


 


MCH   


 


MCHC   


 


RDW   


 


Plt Count   


 


MPV   


 


Neutrophils %   


 


Lymphocytes %   


 


Monocytes %   


 


Eosinophils %   


 


Basophils %   


 


ESR   


 


PT with INR   


 


INR   


 


PTT (Actin FS)   


 


D-Dimer   


 


Sodium  143  


 


Potassium  3.3 L  


 


Chloride  111 H  


 


Carbon Dioxide  23  


 


Anion Gap  9  


 


BUN  8  


 


Creatinine  0.5 L  


 


Creat Clearance w eGFR  > 60  


 


Random Glucose  121 H  


 


Calcium  7.6 L  


 


Phosphorus   


 


Magnesium   


 


Total Bilirubin  0.5  


 


AST  16  


 


ALT  38  


 


Alkaline Phosphatase  98  


 


Creatine Kinase   


 


Troponin I  0.02  Cancelled 


 


C-Reactive Protein    12.8 H


 


Total Protein  6.3 L  


 


Albumin  3.0 L  


 


Triglycerides   


 


Cholesterol   


 


Total LDL Cholesterol   


 


HDL Cholesterol   


 


Total Amylase   


 


Lipase   


 


TSH  0.67  Cancelled 


 


Urine Color   


 


Urine Appearance   


 


Urine pH   


 


Ur Specific Gravity   


 


Urine Protein   


 


Urine Glucose (UA)   


 


Urine Ketones   


 


Urine Blood   


 


Urine Nitrite   


 


Urine Bilirubin   


 


Urine Urobilinogen   


 


Ur Leukocyte Esterase   


 


Urine WBC (Auto)   


 


Urine RBC (Auto)   


 


Ur Epithelial Cells   


 


Urine Mucus   














  03/05/18 03/05/18 03/05/18





  06:45 06:45 06:45


 


WBC   8.6 


 


RBC   3.39 L 


 


Hgb   10.7 


 


Hct   31.6 L 


 


MCV   93.0 


 


MCH   31.6 


 


MCHC   33.9 


 


RDW   13.7 


 


Plt Count   225 


 


MPV   9.1 


 


Neutrophils %   78.1 


 


Lymphocytes %   12.5 


 


Monocytes %   8.1 


 


Eosinophils %   0.9  D 


 


Basophils %   0.4  D 


 


ESR  51 H  


 


PT with INR   


 


INR   


 


PTT (Actin FS)   


 


D-Dimer   


 


Sodium    142


 


Potassium    3.2 L


 


Chloride    111 H


 


Carbon Dioxide    24


 


Anion Gap    7 L


 


BUN    11


 


Creatinine    0.5 L


 


Creat Clearance w eGFR    > 60


 


Random Glucose    124 H


 


Calcium    7.6 L


 


Phosphorus    1.7 L


 


Magnesium    2.1


 


Total Bilirubin    0.5


 


AST    11 L


 


ALT    26


 


Alkaline Phosphatase    88


 


Creatine Kinase   


 


Troponin I   


 


C-Reactive Protein   


 


Total Protein    5.8 L


 


Albumin    2.7 L


 


Triglycerides   


 


Cholesterol   


 


Total LDL Cholesterol   


 


HDL Cholesterol   


 


Total Amylase   


 


Lipase   


 


TSH   


 


Urine Color   


 


Urine Appearance   


 


Urine pH   


 


Ur Specific Gravity   


 


Urine Protein   


 


Urine Glucose (UA)   


 


Urine Ketones   


 


Urine Blood   


 


Urine Nitrite   


 


Urine Bilirubin   


 


Urine Urobilinogen   


 


Ur Leukocyte Esterase   


 


Urine WBC (Auto)   


 


Urine RBC (Auto)   


 


Ur Epithelial Cells   


 


Urine Mucus   














Imaging





- Results


Chest X-ray: Report Reviewed


Cat Scan: Report Reviewed


Ultrasound: Report Reviewed


MRI: Report Reviewed





Problem List





- Problems


(1) Pericardial effusion


Code(s): I31.3 - PERICARDIAL EFFUSION (NONINFLAMMATORY)   





(2) Cholelithiasis


Code(s): K80.20 - CALCULUS OF GALLBLADDER W/O CHOLECYSTITIS W/O OBSTRUCTION   





(3) Afib


Code(s): I48.91 - UNSPECIFIED ATRIAL FIBRILLATION   





(4) Chest pain


Code(s): R07.9 - CHEST PAIN, UNSPECIFIED   


Qualifiers: 


   Chest pain type: unspecified   Qualified Code(s): R07.9 - Chest pain, 

unspecified   





Assessment/Plan





symptomatic A. fib with RVR and long pauses. 


Pericardial effusion ? etiology viral, autoimmune


Sternal/epigastric pain is likely related to pericardial effusion/a. fib. Doubt 

GI/biliary etiology at this time.


Cholelithiasis (seen by sx). Negative White's/abdominal exam and normal CBD 

with normal transaminases, bili, ALP





CRP, ESR, JAZMIN, viral ab


will follow

## 2018-03-06 VITALS — TEMPERATURE: 97.9 F | DIASTOLIC BLOOD PRESSURE: 83 MMHG | SYSTOLIC BLOOD PRESSURE: 130 MMHG

## 2018-03-06 VITALS — HEART RATE: 55 BPM

## 2018-03-06 LAB
ALBUMIN SERPL-MCNC: 2.9 G/DL (ref 3.4–5)
ALP SERPL-CCNC: 93 U/L (ref 45–117)
ALT SERPL-CCNC: 27 U/L (ref 12–78)
ANION GAP SERPL CALC-SCNC: 11 MMOL/L (ref 8–16)
AST SERPL-CCNC: 13 U/L (ref 15–37)
BILIRUB CONJ SERPL-MCNC: < 0.2 MG/DL (ref 0–0.2)
BILIRUB SERPL-MCNC: 0.4 MG/DL (ref 0.2–1)
BUN SERPL-MCNC: 11 MG/DL (ref 7–18)
CALCIUM SERPL-MCNC: 8.6 MG/DL (ref 8.5–10.1)
CHLORIDE SERPL-SCNC: 111 MMOL/L (ref 98–107)
CO2 SERPL-SCNC: 22 MMOL/L (ref 21–32)
CREAT SERPL-MCNC: 0.6 MG/DL (ref 0.55–1.02)
DEPRECATED RDW RBC AUTO: 13.7 % (ref 11.6–15.6)
GLUCOSE SERPL-MCNC: 100 MG/DL (ref 74–106)
HCT VFR BLD CALC: 35.5 % (ref 32.4–45.2)
HGB BLD-MCNC: 12.3 GM/DL (ref 10.7–15.3)
MAGNESIUM SERPL-MCNC: 2.2 MG/DL (ref 1.8–2.4)
MCH RBC QN AUTO: 32.1 PG (ref 25.7–33.7)
MCHC RBC AUTO-ENTMCNC: 34.7 G/DL (ref 32–36)
MCV RBC: 92.3 FL (ref 80–96)
PHOSPHATE SERPL-MCNC: 3.2 MG/DL (ref 2.5–4.9)
PLATELET # BLD AUTO: 295 K/MM3 (ref 134–434)
PMV BLD: 9.1 FL (ref 7.5–11.1)
POTASSIUM SERPLBLD-SCNC: 3.4 MMOL/L (ref 3.5–5.1)
PROT SERPL-MCNC: 6.7 G/DL (ref 6.4–8.2)
RBC # BLD AUTO: 3.84 M/MM3 (ref 3.6–5.2)
SODIUM SERPL-SCNC: 144 MMOL/L (ref 136–145)
WBC # BLD AUTO: 7.3 K/MM3 (ref 4–10)

## 2018-03-06 RX ADMIN — IBUPROFEN SCH MG: 600 TABLET, FILM COATED ORAL at 05:31

## 2018-03-06 RX ADMIN — ENOXAPARIN SODIUM SCH MG: 40 INJECTION SUBCUTANEOUS at 09:25

## 2018-03-06 RX ADMIN — IBUPROFEN SCH MG: 600 TABLET, FILM COATED ORAL at 14:02

## 2018-03-06 RX ADMIN — DEXTROSE MONOHYDRATE SCH MLS/HR: 50 INJECTION, SOLUTION INTRAVENOUS at 04:00

## 2018-03-06 RX ADMIN — FAMOTIDINE SCH MLS/HR: 20 INJECTION, SOLUTION INTRAVENOUS at 09:25

## 2018-03-06 RX ADMIN — COLCHICINE SCH MG: 0.6 CAPSULE ORAL at 09:26

## 2018-03-06 RX ADMIN — POTASSIUM & SODIUM PHOSPHATES POWDER PACK 280-160-250 MG SCH PACKET: 280-160-250 PACK at 05:30

## 2018-03-06 RX ADMIN — METOPROLOL TARTRATE SCH MG: 25 TABLET, FILM COATED ORAL at 09:26

## 2018-03-06 NOTE — PN
Progress Note, Physician


Chief Complaint: 





Feeling better


No sob or chest pain


Tele: sinus rhythm 50-60s with no significant pauses.  No afib since prior to 

midnight 


History of Present Illness: 





58 y/o female with no significant past medical history admitted with constant 

chest pain which started yesterday, constant, pressure type, with radiation to 

the shoulders. Pain increases with breathing, and when she touch her lower part 

of sternum, pain also increases when she leans forward.  No diaphoresis, 

palpitations, dizziness, lightheadedness. Denies othopnea, paroxysmal dyspnea. 

ECG was normal. Pain got better after  getting morphine in ED She has minimally 

elevated TP-I and elevated DDimer


Echocardiogram normal lv systolic function, mod MR/TR, small pericardial 

effusion





- Current Medication List


Current Medications: 


Active Medications





Acetaminophen (Tylenol -)  650 mg PO Q6H PRN


   PRN Reason: PAIN LEVEL 1-5


   Last Admin: 03/05/18 20:43 Dose:  650 mg


Atropine Sulfate (Atropine Injection -)  0.4 mg IVPUSH ONCE PRN


   PRN Reason: ASTHMA


Colchicine (Colcrys -)  0.6 mg PO DAILY [facility]


   Last Admin: 03/06/18 09:26 Dose:  0.6 mg


Enoxaparin Sodium (Lovenox -)  40 mg SQ DAILY [facility]


   Last Admin: 03/06/18 09:25 Dose:  40 mg


Famotidine/Sodium Chloride (Pepcid 20 Mg Premixed Ivpb -)  20 mg in 50 mls @ 

100 mls/hr IVPB BID [facility]


   Last Admin: 03/06/18 09:25 Dose:  100 mls/hr


Diltiazem HCl 125 mg/ Dextrose  125 mls @ 5 mls/hr IVPB TITR SHAREE; 5 MG/HR


   PRN Reason: Protocol


   Last Admin: 03/06/18 04:00 Dose:  10 mg/hr, 10 mls/hr


Ibuprofen (Motrin -)  600 mg PO TID [facility]


   Last Admin: 03/06/18 05:31 Dose:  600 mg


Metoprolol Tartrate (Lopressor -)  25 mg PO BID [facility]


   Last Admin: 03/06/18 09:26 Dose:  25 mg


Morphine Sulfate (Morphine Sulfate)  1 mg IVPUSH Q4H PRN


   PRN Reason: PAIN LEVEL 6-10


Promethazine HCl (Phenergan Injection -)  12.5 mg IVPB Q6H PRN


   PRN Reason: NAUSEA AND/OR VOMITING


   Last Admin: 03/05/18 12:50 Dose:  12.5 mg











- Objective


Vital Signs: 


 Vital Signs











Temperature  99.0 F   03/06/18 07:40


 


Pulse Rate  64   03/06/18 07:40


 


Respiratory Rate  18   03/06/18 07:46


 


Blood Pressure  100/55   03/06/18 07:40


 


O2 Sat by Pulse Oximetry (%)  96   03/06/18 07:46











Constitutional: Yes: No Distress


Neck: Yes: WNL


Cardiovascular: Yes: Regular Rate and Rhythm, S1, S2.  No: JVD, Murmur


Respiratory: Yes: CTA Bilaterally


Gastrointestinal: Yes: Normal Bowel Sounds, Soft


Edema: No


Labs: 


 CBC, BMP





 03/06/18 06:30 





 03/06/18 06:30 





 INR, PTT











INR  1.10  (0.82-1.09)   03/03/18  04:29    














Problem List





- Problems


(1) Afib


Code(s): I48.91 - UNSPECIFIED ATRIAL FIBRILLATION   





(2) Pericardial effusion


Code(s): I31.3 - PERICARDIAL EFFUSION (NONINFLAMMATORY)   





Assessment/Plan





58 y/o female with no significant past medical history admitted with constant 

chest pain has minimally elevated TP-I and elevated DDimer


Elevated CRP


Small pericardial effusion


Transient afib





1) Pericarditis


Symptoms improved


Would continue NSAIDs and colchicine regimen





2) Afib


In sinus since last night


Continue low dose metoprolol and hold diltiazem drip.


No AC given transient afib likely in setting of pericarditis and with low CHADS 

VASC score and pericardial effusion present


Replete K as needed

## 2018-03-06 NOTE — DS
Physical Exam: 


SUBJECTIVE: Patient seen and examined.  Pt remained in sinus rhythm for the 

rest of the day.  Cardizem drip D/Avinash at 1:30pm.  Pt denies epigastric pain 

this afternoon.  Pt feels ready to go home.  Pt denies nausea, headache, chest 

pain, sob, fever, chills.    





OBJECTIVE:





 Vital Signs











 Period  Temp  Pulse  Resp  BP Sys/Nunez  Pulse Ox


 


 Last 24 Hr  97.4 F-99.5 F    18-20  /54-80  96-96








PHYSICAL EXAM





GENERAL: The patient is awake, alert, and fully oriented, in no acute distress.


LUNGS: Breath sounds equal, clear to auscultation bilaterally, no wheezes, no 

crackles, no accessory muscle use. 


HEART: RRR, +S1/S2, no murmur appreciated.


ABDOMEN: mild tenderness to epigastric region.  Soft, nondistended, normoactive 

bowel sounds, no guarding.  


EXTREMITIES: Warm, well-perfused, no edema. 


PSYCH: Normal mood, normal affect.


SKIN: Warm, dry, normal turgor, no rashes or lesions noted





LABS


 Laboratory Results - last 24 hr











  03/04/18 03/06/18 03/06/18





  11:52 06:30 06:30


 


WBC   


 


RBC   


 


Hgb   


 


Hct   


 


MCV   


 


MCH   


 


MCHC   


 


RDW   


 


Plt Count   


 


MPV   


 


ESR    59 H


 


Sodium   144 


 


Potassium   3.4 L 


 


Chloride   111 H 


 


Carbon Dioxide   22 


 


Anion Gap   11 


 


BUN   11 


 


Creatinine   0.6 


 


Creat Clearance w eGFR   > 60 


 


Random Glucose   100 


 


Calcium   8.6 


 


Phosphorus   3.2 


 


Magnesium   2.2 


 


Total Bilirubin   0.4 


 


Direct Bilirubin   < 0.2 


 


AST   13 L 


 


ALT   27 


 


Alkaline Phosphatase   93 


 


C-Reactive Protein   9.5 H 


 


Total Protein   6.7 


 


Albumin   2.9 L 


 


Stool Occult Blood  Negative  














  03/06/18





  06:30


 


WBC  7.3


 


RBC  3.84


 


Hgb  12.3  D


 


Hct  35.5


 


MCV  92.3


 


MCH  32.1


 


MCHC  34.7


 


RDW  13.7


 


Plt Count  295  D


 


MPV  9.1


 


ESR 


 


Sodium 


 


Potassium 


 


Chloride 


 


Carbon Dioxide 


 


Anion Gap 


 


BUN 


 


Creatinine 


 


Creat Clearance w eGFR 


 


Random Glucose 


 


Calcium 


 


Phosphorus 


 


Magnesium 


 


Total Bilirubin 


 


Direct Bilirubin 


 


AST 


 


ALT 


 


Alkaline Phosphatase 


 


C-Reactive Protein 


 


Total Protein 


 


Albumin 


 


Stool Occult Blood 











HOSPITAL COURSE:





Date of Admission:03/03/18





Date of Discharge: 03/06/18





57F with no significant PMH, presents with chest pain, found to have new onset 

afib.  Pt went in and out of rapid afib during this hospitalization.  Pt was 

placed on Cardizem drip which was titrated as needed.  Low dose Lopressor added 

to assist with rate control.  Cardizem drip was D/Avinash at 1:30pm this afternoon 

and pt has remained in sinus rhythm since 10:40pm last night.  CHADSVASc Score 

= 1 (for gender), anticoagulation is not indicated at this time.  Chest pain 

likely 2/2 pericarditis related to small pericardial effusion and positional 

nature of original pain.  Pt to continue Colchicine and Ibuprofen, taper per 

Cardiology (Dr. Vick).  Future repeat echo recommended to assess for 

resolution of pericardial effusion.  Epigastric pain possibly 2/2 referred 

pericarditis pain, as MRCP revealed some gallstones but normal CBD.





3/3/18 MRCP -> cholelithiasis sans evidence of cholecystitis.  Nonspecific mild 

periportal edema 2/2 overhydration or early inflammatory process (likely 

cholecystitis or hepatitis).  Significant mayela basilar atelectatic changes, 

underlying infiltrate cannot be ruled out.  Nonspecific area of subcutaneous 

edema in anterolateral Left abdomen, could be post-traumatic vs 2/2 underlying 

infection.


3/3/18 CXR -> basilar infiltrate may be present on Left.  Some increased 

central markings.


3/3/18 Ab US -> mild fatty infiltrate of liver.  Small gallstones in neck of 

gallbladder, with largest measuring 1.1cm, borderline thickening of gallbladder 

wall sans evidence of pericholecystic free fluid.  (+) sono White's sign.


3/3/18 CXR 8pm -> may be some mild atelectatic changes at bases.


3/4/18 CTA -> no PE.  Mild bibasilar atelectatic changes and probably 

infiltrates with minimal mayela pleural effusion.  Pericardial effusion of 1.5cm 

width noted along Left inferior margin.


3/5/18 Echo -> LVEF 65-70%.  Left ventricle normal in size and systolic 

function.  Right ventricle systolic function normal.  No regional wall motion 

abnormalities.  Moderate MR/TR.  Small pericardial effusion, < 1cm.  





 Microbiology





03/03/18 21:00   Blood - Peripheral Venous   Blood Culture - Preliminary


                            NO GROWTH OBTAINED AFTER 48 HOURS, INCUBATION TO 

CONTINUE


                            FOR 3 DAYS.


03/03/18 20:40   Blood - Peripheral Venous   Blood Culture - Preliminary


                            NO GROWTH OBTAINED AFTER 48 HOURS, INCUBATION TO 

CONTINUE


                            FOR 3 DAYS.





Pt independent for ADLs.  Pt stable for discharge home.  


Minutes to complete discharge: 40





Discharge Summary


Reason For Visit: CHEST PAIN


Current Active Problems





Afib (Acute)


Chest pain (Acute)


Cholelithiasis (Acute)


Pericardial effusion (Acute)








Condition: Improved





- Instructions


Diet, Activity, Other Instructions: 


You were treated for chest pain (likely pericarditis related to a small amount 

of fluid around your heart) and you were found to have an arrhythmia of the 

heart called atrial fibrillation (afib).  





New Medications:


- Take Colchicine daily for the pericarditis


- Take Ibuprofen 3 times a day (every 8 hours) for the pericarditis.  Taper 

down the Colchicine and Ibuprofen with your Cardiologist.


- Take Lopressor twice daily for your heart arrhythmia 





Increase physical activity as tolerated. 





Follow-ups:


- schedule an appointment with a Primary Care Physician (contact information 

for our [facility] is attached under Dr. Olson's name) in 1 week


- schedule an appointment with your Cardiologist (Dr. Do) in 1 week to 

taper pericarditis medications, and to schedule a follow-up Echocardiography 

test (to check that the small amount of fluid around your heart has resolved).





Please return to the hospital immediately if you experience persistent or 

increased palpitations, dizziness, lightheadedness, chest pain, abdominal pain, 

difficulty breathing, or for any medical emergency.


Referrals: 


Fabricio Olson MD [Staff Physician] - 1 Week


Grady Do MD [Staff Physician] - 1 Week


Disposition: HOME





- Home Medications


Comprehensive Discharge Medication List: 


Ambulatory Orders





NK [No Known Home Medication]  03/03/18 








This patient is new to me today: No


Emergency Visit: Yes


ED Registration Date: 03/03/18


Care time: The patient presented to the Emergency Department on the above date 

and was hospitalized for further evaluation of their emergent condition.


Critical Care patient: No





- Discharge Referral


Referred to [facility] No

## 2018-03-06 NOTE — PN
Teaching Attending Note


Name of Resident: Sherry Chatman





ATTENDING PHYSICIAN STATEMENT





I saw and evaluated the patient.


I reviewed the resident's note and discussed the case with the resident.


I agree with the resident's findings and plan as documented.








SUBJECTIVE:c/o epigastric pain. not related to eating or position. denies CP, 

SOB, fever, chills, N/V/C/D


was being discharged in the evening and developed afib with RVR at 180





OBJECTIVE:


 Last Vital Signs











Temp Pulse Resp BP Pulse Ox


 


 97.4 F L  55 L  18   139/79   96 


 


 03/06/18 14:29  03/06/18 14:29  03/06/18 14:29  03/06/18 14:29  03/06/18 07:46








General NAD


CV S1 S2 RRR no murmur/rub/gallop


Lungs CTA B/L No wheezing/rales/rhonchi





ASSESSMENT AND PLAN:


57F with no significant PMH, presents with chest pain, found to have new onset 

afib and have pericarditis with effusion


1. AFib with RVR- liekly induced from pericarditis. was converted to NSR last 

evening but then developed RVR and placed back on cardizem ggt. rate is now 

back in NSR. will turn off cardizem ggt and monitor. if needed will increase 

lopressor. [id number] 1. would benefit from ASA therapy however would hold in 

the setting of acute pericardiits


2. Acute pericarditis with pericardial effusion- symptoms improved. does not 

have any chest pain. no rub appreciated on exam. on IBU and colchicine. would 

continue for now. will need to repeat echo in the future to evaluate if 

effusion resolved. cardio on board


3. Epigastric pain- possible referred pain from pericarditis. u/s and MRCP done 

showing some gallstones but normal CBD. no transminitis suggestive of 

cholestasis. watchful waiting at this tie


4. hypophosphatemia- resolved


5. SIRS-liekly due to percarditis. no indication for abx at this time


6. DVT ppx- lovenox


7. d/c planning in the next 24H if HR remains controlled.

## 2018-03-08 LAB
CV A16 IGG TITR SER IF: (no result) TITER
CV A24 IGG TITR SER IF: (no result) TITER
CV A7 IGG TITR SER IF: (no result) TITER

## 2018-03-15 ENCOUNTER — HOSPITAL ENCOUNTER (INPATIENT)
Dept: HOSPITAL 74 - JER | Age: 58
LOS: 6 days | Discharge: HOME | DRG: 284 | End: 2018-03-21
Attending: NURSE PRACTITIONER | Admitting: INTERNAL MEDICINE
Payer: COMMERCIAL

## 2018-03-15 VITALS — BODY MASS INDEX: 27.9 KG/M2

## 2018-03-15 DIAGNOSIS — I31.3: ICD-10-CM

## 2018-03-15 DIAGNOSIS — I48.92: ICD-10-CM

## 2018-03-15 DIAGNOSIS — I31.9: ICD-10-CM

## 2018-03-15 DIAGNOSIS — K29.50: ICD-10-CM

## 2018-03-15 DIAGNOSIS — I10: ICD-10-CM

## 2018-03-15 DIAGNOSIS — K80.20: Primary | ICD-10-CM

## 2018-03-15 DIAGNOSIS — I48.0: ICD-10-CM

## 2018-03-15 LAB
ALBUMIN SERPL-MCNC: 3.2 G/DL (ref 3.4–5)
ALP SERPL-CCNC: 233 U/L (ref 45–117)
ALT SERPL-CCNC: 77 U/L (ref 12–78)
ANION GAP SERPL CALC-SCNC: 10 MMOL/L (ref 8–16)
AST SERPL-CCNC: 26 U/L (ref 15–37)
BASOPHILS # BLD: 0.4 % (ref 0–2)
BILIRUB SERPL-MCNC: 0.2 MG/DL (ref 0.2–1)
BUN SERPL-MCNC: 12 MG/DL (ref 7–18)
CALCIUM SERPL-MCNC: 8.1 MG/DL (ref 8.5–10.1)
CHLORIDE SERPL-SCNC: 105 MMOL/L (ref 98–107)
CO2 SERPL-SCNC: 25 MMOL/L (ref 21–32)
CREAT SERPL-MCNC: 0.7 MG/DL (ref 0.55–1.02)
DEPRECATED RDW RBC AUTO: 14 % (ref 11.6–15.6)
EOSINOPHIL # BLD: 0.3 % (ref 0–4.5)
GLUCOSE SERPL-MCNC: 107 MG/DL (ref 74–106)
HCT VFR BLD CALC: 32.2 % (ref 32.4–45.2)
HGB BLD-MCNC: 11 GM/DL (ref 10.7–15.3)
LIPASE SERPL-CCNC: 51 U/L (ref 73–393)
LYMPHOCYTES # BLD: 11.6 % (ref 8–40)
MCH RBC QN AUTO: 31.7 PG (ref 25.7–33.7)
MCHC RBC AUTO-ENTMCNC: 34.1 G/DL (ref 32–36)
MCV RBC: 93 FL (ref 80–96)
MONOCYTES # BLD AUTO: 6.8 % (ref 3.8–10.2)
NEUTROPHILS # BLD: 80.9 % (ref 42.8–82.8)
PLATELET # BLD AUTO: 364 K/MM3 (ref 134–434)
PMV BLD: 8.5 FL (ref 7.5–11.1)
POTASSIUM SERPLBLD-SCNC: 4.4 MMOL/L (ref 3.5–5.1)
PROT SERPL-MCNC: 7.1 G/DL (ref 6.4–8.2)
RBC # BLD AUTO: 3.46 M/MM3 (ref 3.6–5.2)
SODIUM SERPL-SCNC: 140 MMOL/L (ref 136–145)
WBC # BLD AUTO: 9.5 K/MM3 (ref 4–10)

## 2018-03-15 RX ADMIN — MORPHINE SULFATE PRN MG: 10 INJECTION INTRAMUSCULAR; INTRAVENOUS; SUBCUTANEOUS at 22:59

## 2018-03-15 NOTE — HP
CHIEF COMPLAINT: epigastric pain, nausea





PCP:





HISTORY OF PRESENT ILLNESS:





Patient is a 57 year old woman with a significant past medical history of 

Atrial fib with RvR (not on anticoagulants), pericarditis, and pericardial 

effusion   She presents to the ED today with epigastric pain and nausea.  She 

states the epigastric pain is constant, pressure type with 10/10 in intensity.  

She is unable to eat, experiencing nausea.  Epigastric pain does not radiate 

but worsens with eating and is not associated with diaphoresis, chest pain or 

palpitations.  She denies lightheadedness.  Denies fever, cough, chills. 

Patient reports one episode of vomiting in hospital after her blood work was 

done. 





Patient was recently here between 3/3/2018 and 3/6/2018 for chest pain and was 

found to have new onset afib.  Patient was placed on a Cardizem drip and 

discharged on Lopressor for rate control  She was not anticoagulated as her 

CHADSVASC score was low.  Her chest pain was thought to be likely secondary to 

her pericarditis related to small pericardial effusion.   Patient was 

discharged with colchicine and ibuprofen taper per cardiology.  





ER course was notable for:


(1) abd u/s cholelithiasis with thick walled gallbladder, acute cholecystitis 

cannot be exclulded


(2) ekg, NSR replaces afib on previous ekg


(3)





Recent Travel:





PAST MEDICAL HISTORY:





PAST SURGICAL HISTORY:





Social History:


Smoking: na


Alcohol:na


Drugs: na





Family History:


Allergies





No Known Allergies Allergy (Verified 03/15/18 09:45)


 








HOME MEDICATIONS:


 Home Medications











 Medication  Instructions  Recorded


 


Colchicine [Colcrys -] 0.6 mg PO DAILY #30 tablet 03/06/18


 


Ibuprofen [Motrin -] 600 mg PO TID #60 tablet 03/06/18


 


Metoprolol Tartrate [Lopressor -] 25 mg PO BID #60 tablet 03/06/18


 


Omeprazole 40 mg PO DAILY 03/15/18














PHYSICAL EXAMINATION


 Vital Signs - 24 hr











  03/15/18 03/15/18





  09:42 16:09


 


Temperature 97.9 F 98.3 F


 


Pulse Rate 79 


 


Pulse Rate [  69





Apical]  


 


Respiratory 19 20





Rate  


 


Blood Pressure 119/70 


 


Blood Pressure  120/90





[Left Arm]  


 


O2 Sat by Pulse 100 98





Oximetry (%)  











GENERAL: Awake, alert, and fully oriented, in no acute distress.


HEAD: Normal with no signs of trauma.


EYES: Pupils equal, round and reactive to light, extraocular movements intact, 

sclera anicteric, conjunctiva clear. No lid lag.


EARS, NOSE, THROAT: Ears normal, nares patent, oropharynx clear without 

exudates. Moist mucous membranes.


NECK: Normal range of motion, supple without lymphadenopathy, JVD, or masses.


LUNGS: Breath sounds equal, clear to auscultation bilaterally. No wheezes, and 

no crackles. No accessory muscle use.


HEART: Regular rate and rhythm, normal S1 and S2 without murmur, rub or gallop.


ABDOMEN: Soft, epigastric pain


MUSCULOSKELETAL: Normal range of motion at all joints. No bony deformities or 

tenderness. No CVA tenderness.


 Laboratory Results - last 24 hr











  03/15/18 03/15/18





  10:53 10:53


 


WBC   9.5  D


 


RBC   3.46 L


 


Hgb   11.0  D


 


Hct   32.2 L


 


MCV   93.0


 


MCH   31.7


 


MCHC   34.1


 


RDW   14.0


 


Plt Count   364  D


 


MPV   8.5


 


Neutrophils %   80.9


 


Lymphocytes %   11.6


 


Monocytes %   6.8


 


Eosinophils %   0.3


 


Basophils %   0.4


 


Sodium  140 


 


Potassium  4.4 


 


Chloride  105 


 


Carbon Dioxide  25 


 


Anion Gap  10 


 


BUN  12 


 


Creatinine  0.7 


 


Creat Clearance w eGFR  > 60 


 


Random Glucose  107 H 


 


Calcium  8.1 L 


 


Total Bilirubin  0.2  D 


 


AST  26 


 


ALT  77 


 


Alkaline Phosphatase  233 H 


 


Troponin I  < 0.02 


 


Total Protein  7.1 


 


Albumin  3.2 L 


 


Lipase  51 L 











ASSESSMENT/PLAN:





Patient is a 57 year old woman with a significant past medical history of 

hypertension.  She presents to the ED today with epigastric pain and nausea.  

She states the epigastric pain is constant, pressure type with 10/10 in 

intensity.  She is unable to eat, experiencing nausea.  Epigastric pain does 

not radiate but worsens with eating and is not associated with diaphoresis, 

chest pain or palpitations.  She denies lightheadedness.  Denies fever, cough, 

chills. Patient reports one episode of vomiting in hospital after her blood 

work was done. 





Patient was recently here between 3/3/2018 and 3/6/2018 for chest pain and was 

found to have new onset afib.  Patient was placed on a Cardizem drip and 

discharged on Lopressor for rate control  She was not anticoagulated as her 

CHADSVASC score was low.  Her chest pain was thought to be likely secondary to 

her pericarditis related to small pericardial effusion.   Patient was 

discharged with colchicine and ibuprofen taper per cardiology.  





Imagng:





3/3/18 MRCP - cholelithiasis, no evidence of cholecystitis.  N


3/4/18 CTA  - no PE.  Mild bibasilar atelectatic changes and probably 

infiltrates with minimal mayela pleural effusion.   Pericardial effusion of 1.5cm 

width noted along Left inferior margin


3/5/18 Echo -LVEF 65-70%.  Left ventricle normal in size and systolic function.

  Right ventricle systolic function normal.  No regional wall motion 

abnormalities.  Moderate MR/TR.  Small pericardial effusion, < 1cm.  


3/15/2018: abd u/s cholelithiasis with thick walled gallbladder, acute 

cholecystitis cannot be excluded


3/15/2018: Echo: larger pericardial effusion compared to last visit








GI:


Abdominal pain


Ultrasound noted above


NPO


Hydration


Protonix


Monitor labs, vitals


Cardiac monitoring


Surgery and GI evaluation





Cardiology:


Chest pain/epigastric pain


EKG show NSR


Monitor on tele


Metoprolol IV


Trend trops


Cardiology to see





Prox. afib


Not on any anticoags


CTA on 3/4, negative for PE


Pericardial effusion seen


Echo as noted above





Pericardial effusion


Echo 3/15 shows increase in pericardial effusion





F.E.N.


Fluids: NS @ 100


Electrolytes: monitor


Nutrition: NPO





DVT: heparin


GI: Protonix





full code











Hospitalist Screening





- Colonoscopy Questionnaire


Colonoscopy Questionnaire: 





Colonoscopy Questionnaire

## 2018-03-15 NOTE — PDOC
History of Present Illness





- History of Present Illness


Initial Comments: 





03/15/18 10:03


56 yo F with h/o A-fib w/RvR ( not on anticoagulation) who reports epigastria 

abdominal pain. Pt. reports severe, sharp, unremitting, abdominal pain, with no 

identifiable triggers or alleviators, beginning 03/13/18. Asx. with multiple 

episodes of non bloody, biliary emesis. Denies back pain, or postprandial pain. 

Normal appetite and last bowel movement yesterday, with normal stools. Denies F/

C, CP,cough, diaphoresis, jaw/neck/shoulder pain, SOB, diarrhea, constipation, 

urinary complaints, sensory disturbance, weakness, lightheadedness. Pain not 

improved with omeprazole 40 mg PO QD. Denies NSAID use. Denies h/o endoscopy, 

GI pathology, or follow with GI. Denies h/o CAD/MI, abnormal stress test, stent 

placement, or CABG. Denies alcohol, tobacco, or illicit drug use. Recent [facility] 

admission ( 03/03-03/06/18) for pericarditis/A-fib











<Suman Chong - Last Filed: 03/15/18 14:09>





<Ruy Andrew - Last Filed: 03/15/18 14:29>





- General


Chief Complaint: Pain


Stated Complaint: REVISIT/ ABD PAIN


Time Seen by Provider: 03/15/18 09:56





Past History





- Past Medical History


Anemia: No


Cardiac Disorders: No


COPD: No


HTN: No


Hypercholesterolemia: No





- Suicide/Smoking/Psychosocial Hx


Smoking History: Never smoked


Have you smoked in the past 12 months: No


Information on smoking cessation initiated: No


Hx Alcohol Use: No


Drug/Substance Use Hx: No


Substance Use Type: None





<Suman Chong - Last Filed: 03/15/18 14:09>





<Ruy Andrew - Last Filed: 03/15/18 14:29>





- Past Medical History


Allergies/Adverse Reactions: 


 Allergies











Allergy/AdvReac Type Severity Reaction Status Date / Time


 


No Known Allergies Allergy   Verified 03/15/18 09:45











Home Medications: 


Ambulatory Orders





Colchicine [Colcrys -] 0.6 mg PO DAILY #30 tablet 03/06/18 


Ibuprofen [Motrin -] 600 mg PO TID #60 tablet 03/06/18 


Metoprolol Tartrate [Lopressor -] 25 mg PO BID #60 tablet 03/06/18 


Omeprazole 40 mg PO DAILY 03/15/18 











**Review of Systems





- Review of Systems


Comments:: 





03/15/18 10:04


GENERAL/CONSTITUTIONAL: No fever or chills. No weakness.


HEAD, EYES, EARS, NOSE AND THROAT: No change in vision. No ear pain or 

discharge. No sore throat.


CARDIOVASCULAR: No chest pain or shortness of breath


RESPIRATORY: No cough, wheezing, or hemoptysis.


GASTROINTESTINAL: + Abdominal pain and nausea and vomiting. No diarrhea or 

constipation.


GENITOURINARY: No dysuria, frequency, or change in urination.


MUSCULOSKELETAL: No joint or muscle swelling or pain. No neck or back pain.


SKIN: No rash


NEUROLOGIC: No headache, vertigo, loss of consciousness, or change in strength/

sensation.


ENDOCRINE: No increased thirst. No abnormal weight change


HEMATOLOGIC/LYMPHATIC: No anemia, easy bleeding, or history of blood clots.


ALLERGIC/IMMUNOLOGIC: No hives or skin allergy.








<Suman Chong - Last Filed: 03/15/18 14:09>





*Physical Exam





- Vital Signs


 Last Vital Signs











Temp Pulse Resp BP Pulse Ox


 


 97.9 F   79   19   119/70   100 


 


 03/15/18 09:42  03/15/18 09:42  03/15/18 09:42  03/15/18 09:42  03/15/18 09:42














- Physical Exam


Comments: 





03/15/18 10:04


GENERAL: Awake, alert, and fully oriented, in no acute distress


HEAD: No signs of trauma, normocephalic, atraumatic 


EYES: PERRLA, EOMI, sclera anicteric, conjunctiva clear


ENT: Hearing grossly normal, nares patent, oropharynx clear without


exudates. Moist mucosa


NECK: Normal ROM, supple, no lymphadenopathy, JVD, or masses


LUNGS: No distress, speaks full sentences, clear to auscultation bilaterally 


HEART: Regular rate and rhythm, normal S1 and S2, no murmurs, rubs or gallops, 

peripheral pulses normal and equal bilaterally. 


ABDOMEN: RUQ ttp. Neg mcghee sign. Neg mcburney point ttp. NBS, NDS.  No 

guarding, no rigidity, no rebound.  No masses. Neg suprapubic. Neg CVA ttp.   


EXTREMITIES : Normal inspection, Normal range of motion, no edema.  No clubbing 

or cyanosis. 


SKIN: Warm, Dry, normal turgor, no rashes or lesions noted








<Suman Chong - Last Filed: 03/15/18 14:09>





- Vital Signs


 Last Vital Signs











Temp Pulse Resp BP Pulse Ox


 


 97.9 F   79   19   119/70   100 


 


 03/15/18 09:42  03/15/18 09:42  03/15/18 09:42  03/15/18 09:42  03/15/18 09:42














<Ruy Andrew - Last Filed: 03/15/18 14:29>





ED Treatment Course





- LABORATORY


CBC & Chemistry Diagram: 


 03/15/18 10:53





 03/15/18 10:53





<Suman Chong - Last Filed: 03/15/18 14:09>





- LABORATORY


CBC & Chemistry Diagram: 


 03/15/18 10:53





 03/15/18 10:53





- ADDITIONAL ORDERS


Additional order review: 


 Laboratory  Results











  03/15/18





  10:53


 


Sodium  140


 


Potassium  4.4


 


Chloride  105


 


Carbon Dioxide  25


 


Anion Gap  10


 


BUN  12


 


Creatinine  0.7


 


Creat Clearance w eGFR  > 60


 


Random Glucose  107 H


 


Calcium  8.1 L


 


Total Bilirubin  0.2  D


 


AST  26


 


ALT  77


 


Alkaline Phosphatase  233 H


 


Troponin I  < 0.02


 


Total Protein  7.1


 


Albumin  3.2 L


 


Lipase  51 L








 











  03/15/18





  10:53


 


RBC  3.46 L


 


MCV  93.0


 


MCHC  34.1


 


RDW  14.0


 


MPV  8.5


 


Neutrophils %  80.9


 


Lymphocytes %  11.6


 


Monocytes %  6.8


 


Eosinophils %  0.3


 


Basophils %  0.4














- RADIOLOGY


Radiology Studies Ordered: 














 Category Date Time Status


 


 CXRPORT [CHEST X-RAY PORTABLE*] [RAD] Stat Radiology  03/15/18 10:07 Completed


 


 ABDOMEN US -LIMITED [US] Stat Ultrasound  03/15/18 10:44 Completed














- Medications


Given in the ED: 


ED Medications














Discontinued Medications














Generic Name Dose Route Start Last Admin





  Trade Name Freq  PRN Reason Stop Dose Admin


 


Al Hydroxide/Mg Hydroxide  30 ml  03/15/18 10:19  03/15/18 10:57





  Mylanta Oral Suspension -  PO  03/15/18 10:20  30 ml





  ONCE ONE   Administration


 


Sodium Chloride  1,000 mls @ 1,000 mls/hr  03/15/18 10:19  03/15/18 10:57





  Normal Saline -  IV  03/15/18 11:18  1,000 mls/hr





  ASDIR STA   Administration


 


Ranitidine HCl  150 mg  03/15/18 10:19  03/15/18 11:00





  Zantac -  PO  03/15/18 10:20  150 mg





  ONCE ONE   Administration


 


Sucralfate  2 gm  03/15/18 10:19  03/15/18 10:57





  Carafate -  PO  03/15/18 10:20  2 gm





  ONCE ONE   Administration














<Ruy Andrew - Last Filed: 03/15/18 14:29>





Medical Decision Making





- Medical Decision Making





03/15/18 10:27


56 yo F with h/o A-fib w/RvR ( not on anticoagulation) who r reports severe, 

sharp, unremitting, abdominal pain, with no identifiable triggers or alleviators

, beginning 03/13/18. Asx. with multiple episodes of non bloody, biliary 

emesis. No identifiable triggers or alleviators. Denies F/C, CP,cough, 

diaphoresis, jaw/neck/shoulder pain, SOB, diarrhea, constipation, urinary 

complaints, sensory disturbance, weakness, lightheadedness. Pain not improved 

with omeprazole 40 mg PO QD. Denies NSAID use. Denies GI or cardiac history. 

HDS. Physical exam with epigastria and RUQ ttp. Pain and N/V most likely 2/2 

gastritis vs esophagitis. Will also consider cholelithiasis, choleycsytitis, 

pancreatitiis, colitis. 





ED Course: 


CBC, CMP, Lipase, Cardiac Pr


EKG, CXR, RUQ U/S


UA


Maloox, Carafate, Pepcid, NS





03/15/18 12:28


Bedside Echo with moderate sized pleural effusion, and no evidence of tamponade.





03/15/18 12:29


Will consult cardiology. 


03/15/18 12:54


top: Neg 





EKG: Low voltage QRS waves with absent NSR, absent URVASHI, or STD. Frequent PVC's 

and normal interval , and axis. 


03/15/18 14:09


Pt. admitted to med/surg tele. 


03/15/18 14:10


trop: neg 


Labs unremarkable





<Suman Chong - Last Filed: 03/15/18 14:09>





*DC/Admit/Observation/Transfer





- Discharge Dispostion


Admit: Yes





- Attestations


Physician Attestion: 





03/15/18 10:04


I attest to the information provided in this note. 











<CastilloSuman - Last Filed: 03/15/18 14:09>





- Discharge Dispostion


Admit: Yes





<Ruy Andrew - Last Filed: 03/15/18 14:29>


Diagnosis at time of Disposition: 


 Epigastric abdominal pain, Pericardial effusion








- Referrals


Referrals: 


Star Hudson MD [Staff Physician] - 





- Patient Instructions


Printed Discharge Instructions:  DI for Epigastric Pain


Additional Instructions: 


Please return to the emergency department with any new or worsening symptoms or 

concerns. Please return to ED if you experience crushing sternal chest pain, 

blood in stools, blood in vomit, or other alarming symptoms. Please follow up 

with your primary care physician within 72 hours. Please follow up with 

gastroenterology within one week, but preferably within 72 hours.

## 2018-03-15 NOTE — PDOC
Attending Attestation





- HPI


HPI: 





03/15/18 10:40


The patient is a 57 year old female with a significant PMH of A-fib with RvR (

not on anticoagulants), pericarditis, and pericardial effusion who presents to 

the emergency department with epigastric pain that began approximately 2 days 

ago. The patient describes the pain as severe, sharp, not associated with food 

intake, and no alleviating factors. The patient states the epigastric pain has 

not improved with omeprazole 40mg. The patient endorses mild SOB at night and 

multiple episodes of non-bloody, non-bilious emesis. The patient reports last 

bowel movement was yesterday. Denies any history of GI pathologies in the past. 





The patient denies changes in appetite, chest pain, back pain, headache and 

dizziness.


Denies fever, chills, nausea, diarrhea and constipation.


Denies dysuria, frequency, urgency and hematuria.





Allergies: NKA


Past surgical history: None reported.


Social history: No reported alcohol, drug, or cigarette use. 








 





- Physicial Exam


PE: 





03/15/18 10:40


Vitals: Triage vital signs reviewed


General Appearance: No acute distress, well nourished, well developed


Head: Atraumatic


Eyes: Pupils equal reactive round, extraocular movement intact


Cardiac: Regular rate and rhythm, no murmurs, no rubs, no gallops


Lungs: Clear to auscultation bilateral, good air movement bilaterally


Abdomen:  (+) Reproducible epigastric pain. Soft, nondistended, normal bowel 

sounds.


Extremities: Full range of motion to all extremities, no cyanosis, clubbing, or 

edema


Skin:  Warm and dry, no rashes or lesions, no rash, no petechiae


Neuro:  AOX3; Cranial Nerves 2-12 grossly intact, Strength intact to all 

extremities, Sensation intact to all extremities, gait normal


Psych: Normal mood, normal affect








<Tatyana Herndon - Last Filed: 03/15/18 11:00>





- Resident


Resident Name: Suman Chong





- ED Attending Attestation


I have performed the following: I have examined & evaluated the patient, The 

case was reviewed & discussed with the resident, I agree w/resident's findings 

& plan, Exceptions are as noted





- Medical Decision Making





03/15/18 16:58





57 years old past medical history significant for recently diagnosed A. fib 

with RVR pericarditis with small pericardial effusion seen by cardiology in 

discharge patient presents with worsening epigastric pain. During her last 

admission they thought her epigastric discomfort was secondary to her 

pericarditis.





Hemodynamically patient is stable her symptoms are persistent constant sharp 

there are no alleviating factors





We'll perform a bedside echo labs EKG troponin observe and reassess





Reevaluation moderate side pericardial effusion on bedside echo





We'll observe overnight for cardiology consultation official echo and further 

management.








<Ruy Andrew - Last Filed: 03/15/18 17:41>





**Heart Score/ECG Review





- ECG Impressions


Comment:: 





03/15/18 17:41





Low-voltage EKG no ST elevations or T-wave inversions. Sinus rhythm





Interpreted by me.








<Ruy Andrew - Last Filed: 03/15/18 17:41>

## 2018-03-15 NOTE — EKG
Test Reason : 

Blood Pressure : ***/*** mmHG

Vent. Rate : 072 BPM     Atrial Rate : 072 BPM

   P-R Int : 146 ms          QRS Dur : 080 ms

    QT Int : 342 ms       P-R-T Axes : 033 024 031 degrees

   QTc Int : 374 ms

 

SINUS RHYTHM WITH FREQUENT PREMATURE VENTRICULAR COMPLEXES

LOW VOLTAGE QRS

NONSPECIFIC T WAVE ABNORMALITY

ABNORMAL ECG

WHEN COMPARED WITH ECG OF 04-MAR-2018 07:55,

SINUS RHYTHM HAS REPLACED ATRIAL FIBRILLATION

ST NO LONGER ELEVATED IN LATERAL LEADS

NONSPECIFIC T WAVE ABNORMALITY HAS REPLACED INVERTED T WAVES IN 

INFERIOR LEADS

Confirmed by ALEXANDRO LOMAX MD ([id number]) on 3/15/2018 12:34:25 PM

 

Referred By:             Confirmed By:ALEXANDRO LOMAX MD

## 2018-03-16 LAB
ALBUMIN SERPL-MCNC: 2.8 G/DL (ref 3.4–5)
ALP SERPL-CCNC: 183 U/L (ref 45–117)
ALT SERPL-CCNC: 51 U/L (ref 12–78)
ANION GAP SERPL CALC-SCNC: 9 MMOL/L (ref 8–16)
AST SERPL-CCNC: 16 U/L (ref 15–37)
BASOPHILS # BLD: 0.4 % (ref 0–2)
BILIRUB SERPL-MCNC: 0.3 MG/DL (ref 0.2–1)
BUN SERPL-MCNC: 15 MG/DL (ref 7–18)
CALCIUM SERPL-MCNC: 8.2 MG/DL (ref 8.5–10.1)
CHLORIDE SERPL-SCNC: 110 MMOL/L (ref 98–107)
CHOLEST SERPL-MCNC: 114 MG/DL (ref 50–200)
CO2 SERPL-SCNC: 23 MMOL/L (ref 21–32)
CREAT SERPL-MCNC: 0.6 MG/DL (ref 0.55–1.02)
DEPRECATED RDW RBC AUTO: 13.6 % (ref 11.6–15.6)
EOSINOPHIL # BLD: 0.7 % (ref 0–4.5)
GLUCOSE SERPL-MCNC: 74 MG/DL (ref 74–106)
HCT VFR BLD CALC: 29.6 % (ref 32.4–45.2)
HDLC SERPL-MCNC: 27 MG/DL (ref 40–60)
HGB BLD-MCNC: 10 GM/DL (ref 10.7–15.3)
INR BLD: 1.34 (ref 0.82–1.09)
LDLC SERPL CALC-MCNC: 76 MG/DL (ref 5–100)
LYMPHOCYTES # BLD: 14.2 % (ref 8–40)
MAGNESIUM SERPL-MCNC: 2.1 MG/DL (ref 1.8–2.4)
MCH RBC QN AUTO: 31.6 PG (ref 25.7–33.7)
MCHC RBC AUTO-ENTMCNC: 33.8 G/DL (ref 32–36)
MCV RBC: 93.4 FL (ref 80–96)
MONOCYTES # BLD AUTO: 8.7 % (ref 3.8–10.2)
NEUTROPHILS # BLD: 76 % (ref 42.8–82.8)
PLATELET # BLD AUTO: 315 K/MM3 (ref 134–434)
PMV BLD: 8.5 FL (ref 7.5–11.1)
POTASSIUM SERPLBLD-SCNC: 4 MMOL/L (ref 3.5–5.1)
PROT SERPL-MCNC: 6.2 G/DL (ref 6.4–8.2)
PT PNL PPP: 15.1 SEC (ref 9.98–11.88)
RBC # BLD AUTO: 3.17 M/MM3 (ref 3.6–5.2)
SODIUM SERPL-SCNC: 142 MMOL/L (ref 136–145)
TRIGL SERPL-MCNC: 113 MG/DL (ref 35–160)
WBC # BLD AUTO: 9.5 K/MM3 (ref 4–10)

## 2018-03-16 RX ADMIN — HEPARIN SODIUM SCH MLS/HR: 5000 INJECTION, SOLUTION INTRAVENOUS at 15:19

## 2018-03-16 RX ADMIN — SODIUM CHLORIDE SCH MLS/HR: 9 INJECTION, SOLUTION INTRAVENOUS at 09:27

## 2018-03-16 RX ADMIN — ONDANSETRON PRN MG: 2 INJECTION INTRAMUSCULAR; INTRAVENOUS at 14:55

## 2018-03-16 RX ADMIN — MORPHINE SULFATE PRN MG: 10 INJECTION INTRAMUSCULAR; INTRAVENOUS; SUBCUTANEOUS at 12:05

## 2018-03-16 RX ADMIN — DEXTROSE SCH MLS/HR: 5 SOLUTION INTRAVENOUS at 16:21

## 2018-03-16 NOTE — CON.GI
Consult


Consult Specialty:: GI


Reason for Consultation:: epigastric pain





- History of Present Illness


History of Present Illness: 





Chart reviewed.


The patient is known to GI service from recent admission for the same complaint

, which was attributed to newly-diagnosed pericardial effusion and new onset of 

a.fib with RVR. She was noted to have choelithiais w/o cholesystitis on US. She 

had normal hepatic/pancreatic profile as well as negative mcghee's. Endoscopic 

work up to investigate epigastric pain was deemed unnecessary at that time.





As per initial intake: Patient is a 57 year old woman with a significant past 

medical history of Atrial fib with RvR (not on anticoagulants), pericarditis, 

and pericardial effusion   She presents to the ED today with epigastric pain 

and nausea.  She states the epigastric pain is constant, pressure type with 10/

10 in intensity.  She is unable to eat, experiencing nausea.  Epigastric pain 

does not radiate but worsens with eating and is not associated with diaphoresis

, chest pain or palpitations.  She denies lightheadedness.  Denies fever, cough

, chills. Patient reports one episode of vomiting in hospital after her blood 

work was done. Patient was recently here between 3/3/2018 and 3/6/2018 for 

chest pain and was found to have new onset afib.  Patient was placed on a 

Cardizem drip and discharged on Lopressor for rate control  She was not 

anticoagulated as her CHADSVASC score was low.  Her chest pain was thought to 

be likely secondary to her pericarditis related to small pericardial effusion. 

  Patient was discharged with colchicine and ibuprofen taper per cardiology.  

Abd u/s cholelithiasis with thick walled gallbladder, acute cholecystitis 

cannot be exclulded. ECG, NSR replaces afib on previous ekg





At the time of this encounter, the patient is in mild distress, c/o epigastirc, 

non-radiating pain w/o nausea, or vomiting. No fever, chills, jaundice, diarrhea

, melena, hematochezia, or hematemesis.  ECG tracing this am shows she has 

A.FIB with RVR, which she didn't have yesterday.





- Past Medical History


...Pregnant: No





- Alcohol/Substance Use


Hx Alcohol Use: No





- Smoking History


Smoking history: Never smoked


Have you smoked in the past 12 months: No





Home Medications





- Allergies


Allergies/Adverse Reactions: 


 Allergies











Allergy/AdvReac Type Severity Reaction Status Date / Time


 


No Known Allergies Allergy   Verified 03/15/18 09:45














- Home Medications


Home Medications: 


Ambulatory Orders





Colchicine [Colcrys -] 0.6 mg PO DAILY #30 tablet 03/06/18 


Ibuprofen [Motrin -] 600 mg PO TID #60 tablet 03/06/18 


Metoprolol Tartrate [Lopressor -] 25 mg PO BID #60 tablet 03/06/18 


Omeprazole 40 mg PO DAILY 03/15/18 











Physical Exam-GI


Vital Signs: 


 Vital Signs











Temperature  99.1 F   03/16/18 05:55


 


Pulse Rate  141 H  03/16/18 09:38


 


Respiratory Rate  20   03/16/18 05:55


 


Blood Pressure  108/83   03/16/18 09:26


 


O2 Sat by Pulse Oximetry (%)  96   03/15/18 21:10











Constitutional: Yes: Mild Distress


Eyes: Yes: Conjunctiva Clear


HENT: Yes: Atraumatic


Neck: Yes: Supple


Cardiovascular: Yes: Tachycardia, Pulse Irregular


Respiratory: Yes: Regular


Gastrointestinal Inspection: No: Ascites, Distention


...Palpate: Yes: Guarding (epig/RUQ), Other (pos mcghee's)


Neurological: Yes: Alert, Oriented


Labs: 


 CBC, BMP





 03/15/18 10:53 





 03/15/18 10:53 





 INR, PTT











INR  1.34  (0.82-1.09)  H  03/16/18  08:00    








 CBCD











WBC  9.5 K/mm3 (4.0-10.0)   03/16/18  11:19    


 


RBC  3.17 M/mm3 (3.60-5.2)  L  03/16/18  11:19    


 


Hgb  10.0 GM/dL (10.7-15.3)  L  03/16/18  11:19    


 


Hct  29.6 % (32.4-45.2)  L  03/16/18  11:19    


 


MCV  93.4 fl (80-96)   03/16/18  11:19    


 


MCHC  33.8 g/dl (32.0-36.0)   03/16/18  11:19    


 


RDW  13.6 % (11.6-15.6)   03/16/18  11:19    


 


Plt Count  315 K/MM3 (134-434)   03/16/18  11:19    


 


MPV  8.5 fl (7.5-11.1)   03/16/18  11:19    








 CMP











Sodium  140 mmol/L (136-145)   03/15/18  10:53    


 


Potassium  4.4 mmol/L (3.5-5.1)   03/15/18  10:53    


 


Chloride  105 mmol/L ()   03/15/18  10:53    


 


Carbon Dioxide  25 mmol/L (21-32)   03/15/18  10:53    


 


Anion Gap  10  (8-16)   03/15/18  10:53    


 


BUN  12 mg/dL (7-18)   03/15/18  10:53    


 


Creatinine  0.7 mg/dL (0.55-1.02)   03/15/18  10:53    


 


Creat Clearance w eGFR  > 60  (>60)   03/15/18  10:53    


 


Calcium  8.1 mg/dL (8.5-10.1)  L  03/15/18  10:53    


 


Total Bilirubin  0.2 mg/dL (0.2-1.0)  D 03/15/18  10:53    


 


AST  26 U/L (15-37)   03/15/18  10:53    


 


ALT  77 U/L (12-78)   03/15/18  10:53    


 


Alkaline Phosphatase  233 U/L ()  H  03/15/18  10:53    


 


Total Protein  7.1 g/dl (6.4-8.2)   03/15/18  10:53    


 


Albumin  3.2 g/dl (3.4-5.0)  L  03/15/18  10:53    














Imaging





- Results


Ultrasound: Report Reviewed





Problem List





- Problems


(1) Cholecystitis


Code(s): K81.9 - CHOLECYSTITIS, UNSPECIFIED   





(2) Atrial fibrillation with rapid ventricular response


Code(s): I48.91 - UNSPECIFIED ATRIAL FIBRILLATION   





(3) Epigastric abdominal pain


Code(s): R10.13 - EPIGASTRIC PAIN   





(4) Afib


Code(s): I48.91 - UNSPECIFIED ATRIAL FIBRILLATION   





(5) Cholelithiasis


Code(s): K80.20 - CALCULUS OF GALLBLADDER W/O CHOLECYSTITIS W/O OBSTRUCTION   





Assessment/Plan





A 57F with epigastric and RUQ pain/tenderness, cholelithiasis, elevated ALP and 

US suggestive of cholecytitis. Developed afib with RVR this morning. The 

patient will need cardiology re-evaluation, suspect a.fib may be triggered by 

sympathetic response to pain. Will also need surgical evaluation for possible 

cholecystetomy. Plan EGD when a.fib is under control. Agree with PPI. NPO until 

seen by surgery. Daily CBC, CMP, direct bili

## 2018-03-16 NOTE — CONSULT
- Consultation


REQUESTING PROVIDER: DIANA Alejandro





CONSULT REQUEST: We have been asked to surgically evaluate this patient for 

possible symptomatic GB disease





PCP:Kota Alejandro NP





HISTORY OF PRESENT ILLNESS: Patient originally seen 3/3/18 w/asymptomatic GB 

disease; w/u revealed her c/o's were of a cardiac nature; she was txed and 

released; she returns again w/ similar c/o's; w/u reveals no evidence of acute 

cholecystitis and again seems to point to a cradiac nature of her c/o's; she 

denies nausea and or vomiting bu tc/o epigastric pain and substernal chest 

discomfort.





PMHx:      none    





PSHx:    none    


 Home Medications











 Medication  Instructions  Recorded


 


Colchicine [Colcrys -] 0.6 mg PO DAILY #30 tablet 03/06/18


 


Ibuprofen [Motrin -] 600 mg PO TID #60 tablet 03/06/18


 


Metoprolol Tartrate [Lopressor -] 25 mg PO BID #60 tablet 03/06/18


 


Omeprazole 40 mg PO DAILY 03/15/18








 Allergies











Allergy/AdvReac Type Severity Reaction Status Date / Time


 


No Known Allergies Allergy   Verified 03/15/18 09:45











PHYSICAL EXAM:


GENERAL: Awake, alert, and fully oriented, in no acute distress.


HEAD: Normal with no signs of trauma.


EYES: PERRL, sclera anicteric, conjunctiva clear.


NECK: Normal ROM, supple without lymphadenopathy, JVD, or masses.


ABDOMEN: Soft, nontender, not distended, normoactive bowel sounds, no guarding, 

no rebound, no masses.  No organomegaly. 


MUSCULOSKELETAL: Normal ROM at all joints. No bony deformities or tenderness. 

No CVA tenderness.


UPPER EXTREMITIES: 2+ pulses, warm, well-perfused. No cyanosis. Cap refill <2 

seconds. No peripheral edema.


LOWER EXTREMITIES: 2+ pulses, warm, well-perfused. No calf tenderness. No 

peripheral edema. 


NEUROLOGICAL: Normal speech, gait not observed.


PSYCH: Cooperative. Good eye contact. Appropriate mood and affect.


SKIN: Warm, dry, normal turgor, no rashes or lesions noted.


 Vital Signs











Temperature  98.2 F   03/16/18 08:00


 


Pulse Rate  151 H  03/16/18 11:07


 


Respiratory Rate  20   03/16/18 08:00


 


Blood Pressure  114/46   03/16/18 11:07


 


O2 Sat by Pulse Oximetry (%)  96   03/16/18 08:00








 Lab Results











WBC  9.5 K/mm3 (4.0-10.0)  D 03/15/18  10:53    


 


RBC  3.46 M/mm3 (3.60-5.2)  L  03/15/18  10:53    


 


Hgb  11.0 GM/dL (10.7-15.3)  D 03/15/18  10:53    


 


Hct  32.2 % (32.4-45.2)  L  03/15/18  10:53    


 


MCV  93.0 fl (80-96)   03/15/18  10:53    


 


MCHC  34.1 g/dl (32.0-36.0)   03/15/18  10:53    


 


RDW  14.0 % (11.6-15.6)   03/15/18  10:53    


 


Plt Count  364 K/MM3 (134-434)  D 03/15/18  10:53    


 


Sodium  140 mmol/L (136-145)   03/15/18  10:53    


 


Potassium  4.4 mmol/L (3.5-5.1)   03/15/18  10:53    


 


Chloride  105 mmol/L ()   03/15/18  10:53    


 


Carbon Dioxide  25 mmol/L (21-32)   03/15/18  10:53    


 


Anion Gap  10  (8-16)   03/15/18  10:53    


 


BUN  12 mg/dL (7-18)   03/15/18  10:53    


 


Creatinine  0.7 mg/dL (0.55-1.02)   03/15/18  10:53    


 


Random Glucose  107 mg/dL ()  H  03/15/18  10:53    


 


Calcium  8.1 mg/dL (8.5-10.1)  L  03/15/18  10:53    


 


Blood Type  O POSITIVE   03/16/18  08:52    


 


Antibody Screen  Negative   03/16/18  08:00    


 


INR  1.34  (0.82-1.09)  H  03/16/18  08:00    








US and w/u to date reviewed





IMP: cholelithiasis w/o acute cholecystitis and pericarditis and possible PUD/

dyspepsia





PLAN: As per primary care team and Cardiology and GI; there is no indication 

for possible lap johnie at this time pending resolution of her other concurrent 

issues.








Brent Box MD FACS





Visit type





- Case Type


Case Type: ED Admission





- Emergency


Emergency Visit: Yes


ED Registration Date: 03/15/18


Care time: The patient presented to the Emergency Department on the above date 

and was hospitalized for further evaluation of their emergent condition.





- New patient


This patient is new to me today: Yes


Date on this admission: 03/17/18





- Critical Care


Critical Care patient: No

## 2018-03-16 NOTE — EKG
Test Reason : 

Blood Pressure : ***/*** mmHG

Vent. Rate : 122 BPM     Atrial Rate : 138 BPM

   P-R Int : 000 ms          QRS Dur : 078 ms

    QT Int : 330 ms       P-R-T Axes : 000 035 -78 degrees

   QTc Int : 470 ms

 

ATRIAL FIBRILLATION WITH RAPID VENTRICULAR RESPONSE WITH PREMATURE

VENTRICULAR OR ABERRANTLY CONDUCTED COMPLEXES

NONSPECIFIC ST AND T WAVE ABNORMALITY

ABNORMAL ECG

WHEN COMPARED WITH ECG OF 15-MAR-2018 10:51,

ATRIAL FIBRILLATION HAS REPLACED SINUS RHYTHM

VENT. RATE HAS INCREASED BY  50 BPM

ST NOW DEPRESSED IN ANTEROLATERAL LEADS

NONSPECIFIC T WAVE ABNORMALITY, WORSE IN INFERIOR LEADS

T WAVE INVERSION NOW EVIDENT IN ANTERIOR LEADS

Confirmed by DARIANA BUCHANAN MD ([id number]) on 3/16/2018 10:27:45 AM

 

Referred By:             Confirmed By:DARIANA BUCHANAN MD

## 2018-03-16 NOTE — CON.CARD
Consult


Consult Specialty:: Cardiology


Referred by:: Ms. Javon NP


Reason for Consultation:: Paroxysmal atrial fibrillation and flutter





- History of Present Illness


Chief Complaint: Epigastric pain and palpitation


History of Present Illness: 


57 year old woman with a PMHx of paroxysmal atrial fibrillation (not on 

anticoagulants), and pericarditis with small pericardial effusion admitted 3/15/

2018 with epigastric pain that began approximately 2 days prior to the 

admission. 





The patient was seen by GI. EGD planned. The patient developed recurrent atrial 

fibrillation, spontaneously cardioverted to sinus and relapsed with initially 

atrial flutter and changed to atrial fibrillation with rapid VR while receiving 

IV diltiazem, metoprolol and digoxin. 





Repeat echocardiogram 3/15/2018 showed normal LV size, wall motion and systolic 

function. LVEF = 70%. Normal RV. Normal LA and RA. Small and moderate (1-2 cm) 

pericardial effusion.





The patient has palpitation while in rapid atrial and flutter. She has 

persistent epigastric pain. She denies chest pain, SOB, dizziness, syncope or 

near syncope. 








- History Source


History Provided By: Patient, Medical Record


Limitations to Obtaining History: No Limitations





- Past Medical History


Cardio/Vascular: Yes: AFIB, Other (Pericardial effusion)


Gastrointestinal: Yes: Other (Epigastric pain)


...Pregnant: No





- Alcohol/Substance Use


Hx Alcohol Use: No





- Smoking History


Smoking history: Never smoked


Have you smoked in the past 12 months: No





Home Medications





- Allergies


Allergies/Adverse Reactions: 


 Allergies











Allergy/AdvReac Type Severity Reaction Status Date / Time


 


No Known Allergies Allergy   Verified 03/15/18 09:45














- Home Medications


Home Medications: 


Ambulatory Orders





Colchicine [Colcrys -] 0.6 mg PO DAILY #30 tablet 03/06/18 


Ibuprofen [Motrin -] 600 mg PO TID #60 tablet 03/06/18 


Metoprolol Tartrate [Lopressor -] 25 mg PO BID #60 tablet 03/06/18 


Omeprazole 40 mg PO DAILY 03/15/18 











Review of Systems





- Review of Systems


Constitutional: reports: No Symptoms


Eyes: reports: No Symptoms


HENT: reports: No Symptoms


Neck: reports: No Symptoms


Cardiovascular: reports: Palpitations


Respiratory: reports: No Symptoms


Gastrointestinal: reports: Abdominal Pain


Genitourinary: reports: No Symptoms


Breasts: reports: No Symptoms Reported


Musculoskeletal: reports: No Symptoms


Integumentary: reports: No Symptoms


Neurological: reports: No Symptoms


Endocrine: reports: No Symptoms


Hematology/Lymphatic: reports: No Symptoms


Psychiatric: reports: No Symptoms


Vital Signs: 


 Vital Signs











Temperature  98.8 F   03/16/18 14:08


 


Pulse Rate  71   03/16/18 14:08


 


Respiratory Rate  18   03/16/18 14:08


 


Blood Pressure  109/71   03/16/18 14:08


 


O2 Sat by Pulse Oximetry (%)  96   03/16/18 08:00








General:  Well developed. Acute ill.  


Head: Normocephalic. Atraumatic, 


Eyes: PERRLA, EOMI. Sclerae anicteric. Conjunctivae clear.


Neck: Supple. No JVD. No bruits. 


Heart: Normal S1, S2: Regularly regular rhythm and rate at the time of exam. No 

murmur. No gallop or rub. 


Lungs: Symmetrical air entry. Clear to auscultation. No crackle. No wheezing or 

rhonchi.  


Abdomen: Soft. Bowel sound positive. Epigastric tenderness. 


Extremities: No edema. Venous stasis. No clubbing or cyanosis. PD 2+, equal 

bilaterally.





- Other Data


Labs, Other Data: 


 CBC, BMP





 03/16/18 11:19 





 03/16/18 11:19 





 INR, PTT











INR  1.34  (0.82-1.09)  H  03/16/18  08:00    








 Troponin, BNP











  03/15/18 03/16/18 03/16/18





  22:00 11:19 11:19


 


Troponin I  < 0.02  < 0.02  Cancelled








 Troponin, BNP











  03/15/18 03/16/18 03/16/18





  22:00 11:19 11:19


 


Troponin I  < 0.02  < 0.02  Cancelled











3/16/2018 atrial fibrillation with rapid VR and VPCs.


Echo: Report Reviewed (Normal LV and RV. Small to moderate (1-2 cm) pericardial 

effusion.)


Ejection Fraction %: LVEF > or = 40 %





Assessment/Plan


57 year old woman with a PMHx of paroxysmal atrial fibrillation (not on 

anticoagulants), and pericarditis with small pericardial effusion admitted 3/15/

2018 with epigastric pain that began approximately 2 days prior to the 

admission. 





The patient was seen by GI. EGD planned. The patient developed recurrent atrial 

fibrillation, spontaneously cardioverted to sinus and relapsed with initially 

atrial flutter and changed to atrial fibrillation with rapid VR while receiving 

IV diltiazem, metoprolol and digoxin. 





Repeat echocardiogram 3/15/2018 showed normal LV size, wall motion and systolic 

function. LVEF = 70%. Normal RV. Normal LA and RA. Small and moderate (1-2 cm) 

pericardial effusion.





1) Paroxysmal atrial fibrillation and flutter.


Start IV Amiodarone with bolus for rate and rhythm control while NPO.


Start IV heparin while in the hospital. Will consider AC with NOAC after GI 

workup. 





2) Pericarditis with small to moderate pericardial effusion. 





The patient was on colchicine for pericardial effusion. Her epigastric pain 

might be caused by colchicine.


Will monitor the pericardial effusion. 


Will consider prednisone for pericardial effusion after GI evaluation.





We will follow with you.

## 2018-03-16 NOTE — PN
Physical Exam: 


SUBJECTIVE: Patient seen and examined at the bedside.


Still having epigastric pain and tenderness


No nausea or vomiting overnight


Pain relieved with Morphine








OBJECTIVE:





Patient sees [facility] for cardiology, he has been consulted


IVF decreased to NS @50cc/hr


Incentive spirometer











 Vital Signs











 Period  Temp  Pulse  Resp  BP Sys/Nunez  Pulse Ox


 


 Last 24 Hr  97.9 F-99.8 F  69-79  19-20  119-140/70-90  








GENERAL: Awake, alert, and fully oriented, in no acute distress.


HEAD: Normal with no signs of trauma.


EYES: Pupils equal, round and reactive to light, extraocular movements intact, 

sclera anicteric, conjunctiva clear. No lid lag.


EARS, NOSE, THROAT: Ears normal, nares patent, oropharynx clear without 

exudates. Moist mucous membranes.


NECK: Normal range of motion, supple without lymphadenopathy, JVD, or masses.


LUNGS: Breath sounds equal, clear to auscultation bilaterally. No wheezes, and 

no crackles. No accessory muscle use.


HEART: Regular rate and rhythm, normal S1 and S2 without murmur, rub or gallop.


ABDOMEN: Soft, epigastric pain


MUSCULOSKELETAL: Normal range of motion at all joints. No bony deformities or 

tenderness. No CVA tenderness.








 Laboratory Results - last 24 hr











  03/15/18 03/15/18 03/15/18





  10:53 10:53 22:00


 


WBC   9.5  D 


 


RBC   3.46 L 


 


Hgb   11.0  D 


 


Hct   32.2 L 


 


MCV   93.0 


 


MCH   31.7 


 


MCHC   34.1 


 


RDW   14.0 


 


Plt Count   364  D 


 


MPV   8.5 


 


Neutrophils %   80.9 


 


Lymphocytes %   11.6 


 


Monocytes %   6.8 


 


Eosinophils %   0.3 


 


Basophils %   0.4 


 


Sodium  140  


 


Potassium  4.4  


 


Chloride  105  


 


Carbon Dioxide  25  


 


Anion Gap  10  


 


BUN  12  


 


Creatinine  0.7  


 


Creat Clearance w eGFR  > 60  


 


Random Glucose  107 H  


 


Calcium  8.1 L  


 


Total Bilirubin  0.2  D  


 


AST  26  


 


ALT  77  


 


Alkaline Phosphatase  233 H  


 


Troponin I  < 0.02   < 0.02


 


Total Protein  7.1  


 


Albumin  3.2 L  


 


Lipase  51 L  








Active Medications











Generic Name Dose Route Start Last Admin





  Trade Name Freq  PRN Reason Stop Dose Admin


 


Heparin Sodium (Porcine)  5,000 unit  03/16/18 14:00  





  Heparin -  SQ   





  TID SHAREE   


 


Sodium Chloride  1,000 mls @ 50 mls/hr  03/16/18 08:10  





  Normal Saline -  IV   





  ASDIR SHAREE   


 


Metoprolol Tartrate  5 mg  03/15/18 19:04  





  Lopressor Injection -  IVPUSH   





  Q4H PRN   





  HYPERTENSION   


 


Morphine Sulfate  2 mg  03/15/18 19:03  03/15/18 22:59





  Morphine Injection -  IVPUSH   2 mg





  Q4H PRN   Administration





  PAIN LEVEL 7 - 10   


 


Ondansetron HCl  4 mg  03/15/18 18:22  





  Zofran Injection  IVPUSH   





  Q6H PRN   





  NAUSEA AND/OR VOMITING   











ASSESSMENT/PLAN:





Patient is a 57 year old woman with a significant past medical history of 

hypertension.  She presents to the ED today with epigastric pain and nausea.  

She states the epigastric pain is constant, pressure type with 10/10 in 

intensity.  She is unable to eat, experiencing nausea.  Epigastric pain does 

not radiate but worsens with eating and is not associated with diaphoresis, 

chest pain or palpitations.  She denies lightheadedness.  Denies fever, cough, 

chills. Patient reports one episode of vomiting in hospital after her blood 

work was done. 





Patient was recently here between 3/3/2018 and 3/6/2018 for chest pain and was 

found to have new onset afib.  Patient was placed on a Cardizem drip and 

discharged on Lopressor for rate control  She was not anticoagulated as her 

CHADSVASC score was low.  Her chest pain was thought to be likely secondary to 

her pericarditis related to small pericardial effusion.   Patient was 

discharged with colchicine and ibuprofen taper per cardiology.  





Imaging:





3/3/18 MRCP - cholelithiasis, no evidence of cholecystitis. 


3/4/18 CTA  - no PE.  Mild bibasilar atelectatic changes and probably 

infiltrates with minimal mayela pleural effusion.   Pericardial effusion of 1.5cm 

width noted along Left inferior margin


3/5/18 Echo -LVEF 65-70%.  Left ventricle normal in size and systolic function.

  Right ventricle systolic function normal.  No regional wall motion 

abnormalities.  Moderate MR/TR.  Small pericardial effusion, < 1cm.  


3/15/2018: abd u/s cholelithiasis with thick walled gallbladder, acute 

cholecystitis cannot be excluded


3/15/2018: Echo: larger pericardial effusion compared to last visit





Chest xray 3/15: large heart, left base/costrophrenic angle changes





Cardiology:


Prox. afib with rapid RVR


Rapid afib this morning, metoprolol IV was ordered as prn and pt did not 

receive any doses overnight


This a.m. was given Cardizem pushes, cardizem drip and lopessor 4mg q8 as well 

as dose of digoxin 0.25, pt converted to NSR but then reverted back to afib/

aflutter rvr 160s


Start on an Amiodorone drip with initial loading dose


Started on a heparin drip


CTA on 3/4, negative for PE





Pericardial effusion, Echo 3/15 shows increase in pericardial effusion


Discussed with cardiologist this morning





GI:


Abdominal pain/epigastric pain


Ultrasound noted above


NPO


Hydration


Protonix


Monitor labs, vitals


Cardiac monitoring


Surgery and GI evaluation


Cardiology consulted for surgical clearance once more stable





F.E.N.


Fluids: NS @ 50


Electrolytes: monitor


Nutrition: NPO





DVT: heparin drip


GI: Protonix IV


Incentive spirometer





full code











Visit type





- Emergency Visit


Emergency Visit: Yes


ED Registration Date: 03/15/18


Care time: The patient presented to the Emergency Department on the above date 

and was hospitalized for further evaluation of their emergent condition.





- New Patient


This patient is new to me today: No





- Critical Care


Critical Care patient: No





- Discharge Referral


Referred to [facility] No

## 2018-03-17 LAB
ALBUMIN SERPL-MCNC: 2.6 G/DL (ref 3.4–5)
ALP SERPL-CCNC: 169 U/L (ref 45–117)
ALT SERPL-CCNC: 43 U/L (ref 12–78)
ANION GAP SERPL CALC-SCNC: 7 MMOL/L (ref 8–16)
AST SERPL-CCNC: 15 U/L (ref 15–37)
BILIRUB CONJ SERPL-MCNC: < 0.2 MG/DL (ref 0–0.2)
BILIRUB SERPL-MCNC: 0.4 MG/DL (ref 0.2–1)
BUN SERPL-MCNC: 19 MG/DL (ref 7–18)
CALCIUM SERPL-MCNC: 7.7 MG/DL (ref 8.5–10.1)
CHLORIDE SERPL-SCNC: 110 MMOL/L (ref 98–107)
CO2 SERPL-SCNC: 26 MMOL/L (ref 21–32)
CREAT SERPL-MCNC: 0.6 MG/DL (ref 0.55–1.02)
DEPRECATED RDW RBC AUTO: 13.8 % (ref 11.6–15.6)
GLUCOSE SERPL-MCNC: 78 MG/DL (ref 74–106)
HCT VFR BLD CALC: 28.2 % (ref 32.4–45.2)
HGB BLD-MCNC: 9.4 GM/DL (ref 10.7–15.3)
INR BLD: 1.37 (ref 0.82–1.09)
MCH RBC QN AUTO: 31.5 PG (ref 25.7–33.7)
MCHC RBC AUTO-ENTMCNC: 33.4 G/DL (ref 32–36)
MCV RBC: 94.4 FL (ref 80–96)
PLATELET # BLD AUTO: 290 K/MM3 (ref 134–434)
PMV BLD: 8.7 FL (ref 7.5–11.1)
POTASSIUM SERPLBLD-SCNC: 4 MMOL/L (ref 3.5–5.1)
PROT SERPL-MCNC: 6.2 G/DL (ref 6.4–8.2)
PT PNL PPP: 15.5 SEC (ref 9.98–11.88)
RBC # BLD AUTO: 2.99 M/MM3 (ref 3.6–5.2)
SODIUM SERPL-SCNC: 143 MMOL/L (ref 136–145)
WBC # BLD AUTO: 8.3 K/MM3 (ref 4–10)

## 2018-03-17 RX ADMIN — DEXTROSE AND SODIUM CHLORIDE SCH MLS/HR: 5; 900 INJECTION, SOLUTION INTRAVENOUS at 10:15

## 2018-03-17 RX ADMIN — MORPHINE SULFATE PRN MG: 10 INJECTION INTRAMUSCULAR; INTRAVENOUS; SUBCUTANEOUS at 03:06

## 2018-03-17 RX ADMIN — MORPHINE SULFATE PRN MG: 10 INJECTION INTRAMUSCULAR; INTRAVENOUS; SUBCUTANEOUS at 08:17

## 2018-03-17 RX ADMIN — ONDANSETRON PRN MG: 2 INJECTION INTRAMUSCULAR; INTRAVENOUS at 19:45

## 2018-03-17 RX ADMIN — SODIUM CHLORIDE SCH MLS/HR: 9 INJECTION, SOLUTION INTRAVENOUS at 08:35

## 2018-03-17 RX ADMIN — HEPARIN SODIUM SCH MLS/HR: 5000 INJECTION, SOLUTION INTRAVENOUS at 15:01

## 2018-03-17 RX ADMIN — MORPHINE SULFATE PRN MG: 10 INJECTION INTRAMUSCULAR; INTRAVENOUS; SUBCUTANEOUS at 19:45

## 2018-03-17 RX ADMIN — PANTOPRAZOLE SODIUM SCH MG: 40 INJECTION, POWDER, FOR SOLUTION INTRAVENOUS at 10:15

## 2018-03-17 NOTE — PN
Progress Note (short form)





- Note


Progress Note: 





Subjective: The patient was seen and examined at the bedside, she has reports 

of epigastric pain this morning. 


Remains NPO


No win sinus rhythm


On Amio and heparin gtt





 Current Medications











Generic Name Dose Route Start Last Admin





  Trade Name Freq  PRN Reason Stop Dose Admin


 


Heparin Sodium (Porcine)  1,000 unit  03/16/18 15:09  03/17/18 01:00





  Heparin -  IVPUSH   1,000 unit





  PRN PRN   Administration





  Heparin   


 


Heparin Sodium (Porcine)  5,000 unit  03/16/18 15:09  





  Heparin -  IVPUSH   





  PRN PRN   





  Heparin   


 


Heparin Sodium/Dextrose  25,000 units in 500 mls @ 20 mls/hr  03/16/18 15:15  03 /17/18 01:00





  Heparin Infusion -  IVPB   1,100 units/hr





  TITR SHAREE   22 mls/hr





  Protocol   Titration





  1,000 UNITS/HR   


 


Amiodarone HCl 450 mg/  250 mls @ 16.66 mls/hr  03/16/18 16:00  03/16/18 16:21





  Dextrose  IV   0.5 mg/min





  TITR SHAREE   16.66 mls/hr





  Protocol   Administration





  0.5 MG/MIN   


 


Dextrose/Sodium Chloride  1,000 mls @ 75 mls/hr  03/17/18 09:15  





  D5-Ns -  IV   





  ASDIR SHAREE   


 


Morphine Sulfate  2 mg  03/15/18 19:03  03/17/18 08:17





  Morphine Injection -  IVPUSH   2 mg





  Q4H PRN   Administration





  PAIN LEVEL 7 - 10   


 


Ondansetron HCl  4 mg  03/15/18 18:22  03/16/18 14:55





  Zofran Injection  IVPUSH   4 mg





  Q6H PRN   Administration





  NAUSEA AND/OR VOMITING   








Objective: 


 Vital Signs











 Period  Temp  Pulse  Resp  BP Sys/Nunez  Pulse Ox


 


 Last 24 Hr  97.9 F-99.1 F    18-20  107-143/46-90  96








Physical Exam: 


General: NAD, A&Ox3


Lungs: CTA bilaterally


Heart: RRR, S1S2


Abd: Soft, mild epigastric tenderness. Normoactive bowel sounds


Ext: Warm, well-perfused. 2+ DP/PT bilaterally


Neuro: CN 2-12 intact





 CBCD











WBC  8.3 K/mm3 (4.0-10.0)   03/17/18  06:59    


 


RBC  2.99 M/mm3 (3.60-5.2)  L  03/17/18  06:59    


 


Hgb  9.4 GM/dL (10.7-15.3)  L  03/17/18  06:59    


 


Hct  28.2 % (32.4-45.2)  L  03/17/18  06:59    


 


MCV  94.4 fl (80-96)   03/17/18  06:59    


 


MCHC  33.4 g/dl (32.0-36.0)   03/17/18  06:59    


 


RDW  13.8 % (11.6-15.6)   03/17/18  06:59    


 


Plt Count  290 K/MM3 (134-434)   03/17/18  06:59    


 


MPV  8.7 fl (7.5-11.1)   03/17/18  06:59    








 CMP











Sodium  143 mmol/L (136-145)   03/17/18  06:59    


 


Potassium  4.0 mmol/L (3.5-5.1)   03/17/18  06:59    


 


Chloride  110 mmol/L ()  H  03/17/18  06:59    


 


Carbon Dioxide  26 mmol/L (21-32)   03/17/18  06:59    


 


Anion Gap  7  (8-16)  L  03/17/18  06:59    


 


BUN  19 mg/dL (7-18)  H  03/17/18  06:59    


 


Creatinine  0.6 mg/dL (0.55-1.02)   03/17/18  06:59    


 


Creat Clearance w eGFR  > 60  (>60)   03/17/18  06:59    


 


Random Glucose  78 mg/dL ()   03/17/18  06:59    


 


Calcium  7.7 mg/dL (8.5-10.1)  L  03/17/18  06:59    


 


Total Bilirubin  0.4 mg/dL (0.2-1.0)  D 03/17/18  06:59    


 


AST  15 U/L (15-37)   03/17/18  06:59    


 


ALT  43 U/L (12-78)   03/17/18  06:59    


 


Alkaline Phosphatase  169 U/L ()  H  03/17/18  06:59    


 


Total Protein  6.2 g/dl (6.4-8.2)  L  03/17/18  06:59    


 


Albumin  2.6 g/dl (3.4-5.0)  L  03/17/18  06:59    








 CARDIAC ENZYMES











Troponin I  < 0.02 ng/ml (0.00-0.05)   03/16/18  11:19    








Assessment: This is a 57 year old female with PMHx of paroxysmal a.fib (

diagnosed on prior admission 3/2018), pericarditis, who presented to the ED 

with epigastric pain and nausea. 





Plan: 


1) Paroxysmal A.fib with RVR


- Converted to sinus rhythm overnight


- On amiodarone gtt, transition to po per cardiology


- On Heparin gtt, consider NOAC upon discharge


- Appreciate cardiology consult





2) Pericardial effusion


- Treated on last admittion 3/3-3/6 for acute pericarditis and discharged on 

colchicine and ibuprofen


- ECHO 3/15 with increase in pericardial effusion


- No evidence of cardiac tamponade


- Continue to monitor closely





3) Epigastric pain


- Will need EGD once a.fib controlled


- Keep NPO


- Protonix


- MRCP 3/3 with cholelithiasis and no evidence of cholecystitis


- Abdominal ultrasound: thick walled gallbladder, acute cholecystitis cannot be 

excluded 


- Given symptoms, will treat for acute cholecystits. Start Flagyl. Check qtc on 

EKG and if wnl will start Levaquin


- Appreciate GI consult: EGD when a.fib controlled


- Appreciate surgery consult





4) F/E/N:


- Monitor electrolytes


- NPO


- IV fluids: switch to D5NS 





5) Prophylaxis: 


- On Heparin gtt





6) Dispo:


- Requires continued inpatient care





CODE STATUS: FULL CODE





Visit type





- Emergency Visit


Emergency Visit: Yes


ED Registration Date: 03/15/18


Care time: The patient presented to the Emergency Department on the above date 

and was hospitalized for further evaluation of their emergent condition.





- New Patient


This patient is new to me today: Yes


Date on this admission: 03/17/18





- Critical Care


Critical Care patient: No

## 2018-03-17 NOTE — EKG
Test Reason : 

Blood Pressure : ***/*** mmHG

Vent. Rate : 058 BPM     Atrial Rate : 058 BPM

   P-R Int : 148 ms          QRS Dur : 090 ms

    QT Int : 460 ms       P-R-T Axes : 014 028 010 degrees

   QTc Int : 451 ms

 

SINUS BRADYCARDIA

NONSPECIFIC T WAVE ABNORMALITY

ABNORMAL ECG

WHEN COMPARED WITH ECG OF 16-MAR-2018 09:02,

SINUS RHYTHM HAS REPLACED ATRIAL FIBRILLATION

VENT. RATE HAS DECREASED BY  64 BPM

ST NO LONGER DEPRESSED IN ANTERIOR LEADS

Confirmed by RACHEL MORENO MD ([id number]) on 3/17/2018 6:39:18 PM

 

Referred By: BURKE WARNER           Confirmed By:RACHEL MORENO MD

## 2018-03-17 NOTE — PN
Progress Note, Physician


Chief Complaint: 


still with nausea and epigastric pain. 


tele nsr @ 55-60 bpm





History of Present Illness: 


57 year old woman with a PMHx of paroxysmal atrial fibrillation (not on 

anticoagulants), and pericarditis with small pericardial effusion admitted 3/15/

2018 with epigastric pain that began approximately 2 days prior to the 

admission. 





The patient was seen by GI. EGD planned. The patient developed recurrent atrial 

fibrillation, spontaneously cardioverted to sinus and relapsed with initially 

atrial flutter and changed to atrial fibrillation with rapid VR while receiving 

IV diltiazem, metoprolol and digoxin. 





Repeat echocardiogram 3/15/2018 showed normal LV size, wall motion and systolic 

function. LVEF = 70%. Normal RV. Normal LA and RA. Small and moderate (1-2 cm) 

pericardial effusion.





she is in nsr today with rates 55-60. 








- Current Medication List


Current Medications: 


Active Medications





Heparin Sodium (Porcine) (Heparin -)  1,000 unit IVPUSH PRN PRN


   PRN Reason: Heparin


   Last Admin: 03/17/18 01:00 Dose:  1,000 unit


Heparin Sodium (Porcine) (Heparin -)  5,000 unit IVPUSH PRN PRN


   PRN Reason: Heparin


Heparin Sodium/Dextrose (Heparin Infusion -)  25,000 units in 500 mls @ 20 mls/

hr IVPB TITR SHAREE; 1,000 UNITS/HR


   PRN Reason: Protocol


   Last Titration: 03/17/18 01:00 Dose:  1,100 units/hr, 22 mls/hr


Amiodarone HCl 450 mg/ (Dextrose)  250 mls @ 16.66 mls/hr IV TITR SHAREE; 0.5 MG/

MIN


   PRN Reason: Protocol


   Last Admin: 03/16/18 16:21 Dose:  0.5 mg/min, 16.66 mls/hr


Dextrose/Sodium Chloride (D5-Ns -)  1,000 mls @ 75 mls/hr IV ASDIR SHAREE


   Last Admin: 03/17/18 10:15 Dose:  75 mls/hr


Metronidazole (Flagyl 500mg Premixed Ivpb -)  500 mg in 100 mls @ 100 mls/hr 

IVPB Q8H-IV SHAREE


   Last Admin: 03/17/18 10:16 Dose:  100 mls/hr


Morphine Sulfate (Morphine Injection -)  2 mg IVPUSH Q4H PRN


   PRN Reason: PAIN LEVEL 7 - 10


   Last Admin: 03/17/18 08:17 Dose:  2 mg


Ondansetron HCl (Zofran Injection)  4 mg IVPUSH Q6H PRN


   PRN Reason: NAUSEA AND/OR VOMITING


   Last Admin: 03/16/18 14:55 Dose:  4 mg


Pantoprazole Sodium (Protonix Iv)  40 mg IVPUSH DAILY SHAREE


   Last Admin: 03/17/18 10:15 Dose:  40 mg











- Objective


Vital Signs: 


 Vital Signs











Temperature  98 F   03/17/18 10:00


 


Pulse Rate  68   03/17/18 10:00


 


Respiratory Rate  18   03/17/18 10:00


 


Blood Pressure  128/80   03/17/18 10:00


 


O2 Sat by Pulse Oximetry (%)  96   03/16/18 21:00











Constitutional: Yes: No Distress, Calm


Eyes: Yes: Conjunctiva Clear, EOM Intact


HENT: Yes: Atraumatic, Normocephalic


Neck: Yes: Trachea Midline


Cardiovascular: Yes: Regular Rate and Rhythm


Respiratory: Yes: CTA Bilaterally


Gastrointestinal: Yes: Normal Bowel Sounds, Soft


Extremities: Yes: WNL


Edema: No


Peripheral Pulses WNL: Yes


Labs: 


 CBC, BMP





 03/17/18 06:59 





 03/17/18 06:59 





 INR, PTT











INR  1.37  (0.82-1.09)  H  03/17/18  06:59    














Problem List





- Problems


(1) Atrial fibrillation with rapid ventricular response


Assessment/Plan: 


1) Paroxysmal atrial fibrillation and flutter.


Start IV Amiodarone with bolus for rate and rhythm control while NPO.


Start IV heparin while in the hospital. Will consider AC with NOAC after GI 

workup. 


She is in nsr today. will change amio to PO when taking PO. 





Code(s): I48.91 - UNSPECIFIED ATRIAL FIBRILLATION   





(2) Pericardial effusion


Assessment/Plan: 


2) Pericarditis with small to moderate pericardial effusion. 


The patient was on colchicine for pericardial effusion. Her epigastric pain is 

probably not caused by colchicine given the persistent nature post DC of the 

drug.


Will monitor the pericardial effusion. 


Will consider prednisone for pericardial effusion after GI evaluation, but 

would rather treat with colchicine and indomethacin.  Will reassess regimen 

post EGD.


will follow serial echoes as well as esr and crp.


would also consider rheumatology evaluation for serologies and workup.


Code(s): I31.3 - PERICARDIAL EFFUSION (NONINFLAMMATORY)   





Assessment/Plan


57 year old woman with a PMHx of paroxysmal atrial fibrillation (not on 

anticoagulants), and pericarditis with small pericardial effusion admitted 3/15/

2018 with epigastric pain that began approximately 2 days prior to the 

admission. 





The patient was seen by GI. EGD planned. The patient developed recurrent atrial 

fibrillation, spontaneously cardioverted to sinus and relapsed with initially 

atrial flutter and changed to atrial fibrillation with rapid VR while receiving 

IV diltiazem, metoprolol and digoxin. 





Repeat echocardiogram 3/15/2018 showed normal LV size, wall motion and systolic 

function. LVEF = 70%. Normal RV. Normal LA and RA. Small and moderate (1-2 cm) 

pericardial effusion.





1) Paroxysmal atrial fibrillation and flutter.


Start IV Amiodarone with bolus for rate and rhythm control while NPO.


Start IV heparin while in the hospital. Will consider AC with NOAC after GI 

workup. 





2) Pericarditis with small to moderate pericardial effusion. 





The patient was on colchicine for pericardial effusion. Her epigastric pain 

might be caused by colchicine.


Will monitor the pericardial effusion. 


Will consider prednisone for pericardial effusion after GI evaluation.

## 2018-03-18 LAB
DEPRECATED RDW RBC AUTO: 14 % (ref 11.6–15.6)
HCT VFR BLD CALC: 28.7 % (ref 32.4–45.2)
HGB BLD-MCNC: 9.8 GM/DL (ref 10.7–15.3)
MCH RBC QN AUTO: 31.8 PG (ref 25.7–33.7)
MCHC RBC AUTO-ENTMCNC: 34.1 G/DL (ref 32–36)
MCV RBC: 93.3 FL (ref 80–96)
PLATELET # BLD AUTO: 301 K/MM3 (ref 134–434)
PMV BLD: 8.6 FL (ref 7.5–11.1)
RBC # BLD AUTO: 3.08 M/MM3 (ref 3.6–5.2)
WBC # BLD AUTO: 7.2 K/MM3 (ref 4–10)

## 2018-03-18 RX ADMIN — PANTOPRAZOLE SODIUM SCH MG: 40 INJECTION, POWDER, FOR SOLUTION INTRAVENOUS at 09:41

## 2018-03-18 RX ADMIN — HEPARIN SODIUM SCH MLS/HR: 5000 INJECTION, SOLUTION INTRAVENOUS at 16:38

## 2018-03-18 RX ADMIN — ONDANSETRON PRN MG: 2 INJECTION INTRAMUSCULAR; INTRAVENOUS at 20:17

## 2018-03-18 RX ADMIN — DEXTROSE AND SODIUM CHLORIDE SCH MLS/HR: 5; 900 INJECTION, SOLUTION INTRAVENOUS at 16:41

## 2018-03-18 RX ADMIN — DEXTROSE SCH MLS/HR: 5 SOLUTION INTRAVENOUS at 12:43

## 2018-03-18 RX ADMIN — MORPHINE SULFATE PRN MG: 10 INJECTION INTRAMUSCULAR; INTRAVENOUS; SUBCUTANEOUS at 05:21

## 2018-03-18 RX ADMIN — ONDANSETRON PRN MG: 2 INJECTION INTRAMUSCULAR; INTRAVENOUS at 05:21

## 2018-03-18 NOTE — PN
Progress Note, Physician


Chief Complaint: 


still with nausea and epigastric pain. 


tele af with RVR, now nsr @ 55-60 bpm





History of Present Illness: 


57 year old woman with a PMHx of paroxysmal atrial fibrillation (not on 

anticoagulants), and pericarditis with small pericardial effusion admitted 3/15/

2018 with epigastric pain that began approximately 2 days prior to the 

admission. 





The patient was seen by GI. EGD planned. The patient developed recurrent atrial 

fibrillation, spontaneously cardioverted to sinus and relapsed with initially 

atrial flutter and changed to atrial fibrillation with rapid VR while receiving 

IV diltiazem, metoprolol and digoxin. 





Repeat echocardiogram 3/15/2018 showed normal LV size, wall motion and systolic 

function. LVEF = 70%. Normal RV. Normal LA and RA. Small and moderate (1-2 cm) 

pericardial effusion.





3/18/18: she had recurrent atrial fibrillation with rvr responded to dig load 

with amiodarone. 


she is in nsr now with rates 55-60. 








- Current Medication List


Current Medications: 


Active Medications





Digoxin (Lanoxin Injection -)  0.25 mg IVPUSH ONCE ONE


   Stop: 03/18/18 15:31


Digoxin (Lanoxin Injection -)  0.25 mg IVPUSH ONCE ONE


   Stop: 03/18/18 21:31


Heparin Sodium (Porcine) (Heparin -)  1,000 unit IVPUSH PRN PRN


   PRN Reason: Heparin


   Last Admin: 03/17/18 01:00 Dose:  1,000 unit


Heparin Sodium (Porcine) (Heparin -)  5,000 unit IVPUSH PRN PRN


   PRN Reason: Heparin


Heparin Sodium/Dextrose (Heparin Infusion -)  25,000 units in 500 mls @ 20 mls/

hr IVPB TITR SHAREE; 1,000 UNITS/HR


   PRN Reason: Protocol


   Last Admin: 03/17/18 15:01 Dose:  1,100 units/hr, 22 mls/hr


Amiodarone HCl 450 mg/ (Dextrose)  250 mls @ 16.66 mls/hr IV TITR SHAREE; 0.5 MG/

MIN


   PRN Reason: Protocol


   Last Admin: 03/18/18 12:43 Dose:  0.5 mg/min, 16.66 mls/hr


Dextrose/Sodium Chloride (D5-Ns -)  1,000 mls @ 75 mls/hr IV ASDIR SHAREE


   Last Admin: 03/17/18 10:15 Dose:  75 mls/hr


Metronidazole (Flagyl 500mg Premixed Ivpb -)  500 mg in 100 mls @ 100 mls/hr 

IVPB Q8H-IV SHAREE


   Last Admin: 03/18/18 09:44 Dose:  100 mls/hr


Morphine Sulfate (Morphine Injection -)  2 mg IVPUSH Q4H PRN


   PRN Reason: PAIN LEVEL 7 - 10


   Last Admin: 03/18/18 05:21 Dose:  2 mg


Ondansetron HCl (Zofran Injection)  4 mg IVPUSH Q6H PRN


   PRN Reason: NAUSEA AND/OR VOMITING


   Last Admin: 03/18/18 05:21 Dose:  4 mg


Pantoprazole Sodium (Protonix Iv)  40 mg IVPUSH DAILY SHAREE


   Last Admin: 03/18/18 09:41 Dose:  40 mg











- Objective


Vital Signs: 


 Vital Signs











Temperature  98 F   03/18/18 09:00


 


Pulse Rate  150 H  03/18/18 09:40


 


Respiratory Rate  18   03/18/18 09:00


 


Blood Pressure  133/88   03/18/18 09:00


 


O2 Sat by Pulse Oximetry (%)  96   03/18/18 09:00











Constitutional: Yes: No Distress, Calm


Eyes: Yes: Conjunctiva Clear, EOM Intact


HENT: Yes: Normocephalic


Neck: Yes: Trachea Midline


Cardiovascular: Yes: Regular Rate and Rhythm


Respiratory: Yes: CTA Bilaterally


Extremities: Yes: WNL


Edema: No


Peripheral Pulses WNL: Yes


Labs: 


 CBC, BMP





 03/18/18 06:57 





 03/17/18 06:59 





 INR, PTT











INR  1.37  (0.82-1.09)  H  03/17/18  06:59    














Problem List





- Problems


(1) Atrial fibrillation with rapid ventricular response


Assessment/Plan: 


1) Paroxysmal atrial fibrillation and flutter.


Start IV Amiodarone with bolus for rate and rhythm control while NPO.


Start IV heparin while in the hospital. Will consider AC with NOAC after GI 

workup. 


She is in nsr today. will change amio to PO when taking PO. 


She has no cardiac contraindications to EGD.  This is a low risk procedure. 


Code(s): I48.91 - UNSPECIFIED ATRIAL FIBRILLATION   





(2) Pericardial effusion


Assessment/Plan: 


2) Pericarditis with small to moderate pericardial effusion. 


The patient was on colchicine for pericardial effusion. Her epigastric pain is 

probably not caused by colchicine given the persistent nature post DC of the 

drug.


Will monitor the pericardial effusion. 


Will consider prednisone for pericardial effusion after GI evaluation, but 

would rather treat with colchicine and indomethacin.  Will reassess regimen 

post EGD.


will follow serial echoes as well as esr and crp.


would also consider rheumatology evaluation for serologies and workup.


Code(s): I31.3 - PERICARDIAL EFFUSION (NONINFLAMMATORY)

## 2018-03-18 NOTE — PN
Progress Note (short form)





- Note


Progress Note: 





Subjective: The patient was seen and examined at the bedside, she denies any 

symptoms at this time


Remains on amiodarone gtt, given po digoxin today





 Current Medications











Generic Name Dose Route Start Last Admin





  Trade Name Freq  PRN Reason Stop Dose Admin


 


Digoxin  0.25 mg  03/18/18 21:30  





  Lanoxin Injection -  IVPUSH  03/18/18 21:31  





  ONCE ONE   


 


Heparin Sodium (Porcine)  1,000 unit  03/16/18 15:09  03/17/18 01:00





  Heparin -  IVPUSH   1,000 unit





  PRN PRN   Administration





  Heparin   


 


Heparin Sodium (Porcine)  5,000 unit  03/16/18 15:09  





  Heparin -  IVPUSH   





  PRN PRN   





  Heparin   


 


Heparin Sodium/Dextrose  25,000 units in 500 mls @ 20 mls/hr  03/16/18 15:15  03 /17/18 15:01





  Heparin Infusion -  IVPB   1,100 units/hr





  TITR SHAREE   22 mls/hr





  Protocol   Administration





  1,000 UNITS/HR   


 


Amiodarone HCl 450 mg/  250 mls @ 16.66 mls/hr  03/16/18 16:00  03/18/18 12:43





  Dextrose  IV   0.5 mg/min





  TITR SHAREE   16.66 mls/hr





  Protocol   Administration





  0.5 MG/MIN   


 


Dextrose/Sodium Chloride  1,000 mls @ 75 mls/hr  03/17/18 09:15  03/17/18 10:15





  D5-Ns -  IV   75 mls/hr





  ASDIR SHAREE   Administration


 


Metronidazole  500 mg in 100 mls @ 100 mls/hr  03/17/18 10:00  03/18/18 09:44





  Flagyl 500mg Premixed Ivpb -  IVPB   100 mls/hr





  Q8H-IV SHAREE   Administration


 


Morphine Sulfate  2 mg  03/15/18 19:03  03/18/18 05:21





  Morphine Injection -  IVPUSH   2 mg





  Q4H PRN   Administration





  PAIN LEVEL 7 - 10   


 


Ondansetron HCl  4 mg  03/15/18 18:22  03/18/18 05:21





  Zofran Injection  IVPUSH   4 mg





  Q6H PRN   Administration





  NAUSEA AND/OR VOMITING   


 


Pantoprazole Sodium  40 mg  03/17/18 10:00  03/18/18 09:41





  Protonix Iv  IVPUSH   40 mg





  DAILY SHAREE   Administration








Objective: 


 


 Vital Signs











 Period  Temp  Pulse  Resp  BP Sys/Nunez  Pulse Ox


 


 Last 24 Hr  98 F-98.8 F    18-18  120-143/57-92  96-96








Physical Exam: 


General: NAD, A&Ox3


Lungs: CTA bilaterally


Heart: RRR, S1S2


Abd: Soft, non-tender, non-distended. Normoactive bowel sounds


Ext: Warm, well-perfused. 2+ DP/PT bilaterally


Neuro: CN 2-12 intact





 CBCD











WBC  7.2 K/mm3 (4.0-10.0)   03/18/18  06:57    


 


RBC  3.08 M/mm3 (3.60-5.2)  L  03/18/18  06:57    


 


Hgb  9.8 GM/dL (10.7-15.3)  L  03/18/18  06:57    


 


Hct  28.7 % (32.4-45.2)  L  03/18/18  06:57    


 


MCV  93.3 fl (80-96)   03/18/18  06:57    


 


MCHC  34.1 g/dl (32.0-36.0)   03/18/18  06:57    


 


RDW  14.0 % (11.6-15.6)   03/18/18  06:57    


 


Plt Count  301 K/MM3 (134-434)   03/18/18  06:57    


 


MPV  8.6 fl (7.5-11.1)   03/18/18  06:57    








 CMP











Sodium  143 mmol/L (136-145)   03/17/18  06:59    


 


Potassium  4.0 mmol/L (3.5-5.1)   03/17/18  06:59    


 


Chloride  110 mmol/L ()  H  03/17/18  06:59    


 


Carbon Dioxide  26 mmol/L (21-32)   03/17/18  06:59    


 


Anion Gap  7  (8-16)  L  03/17/18  06:59    


 


BUN  19 mg/dL (7-18)  H  03/17/18  06:59    


 


Creatinine  0.6 mg/dL (0.55-1.02)   03/17/18  06:59    


 


Creat Clearance w eGFR  > 60  (>60)   03/17/18  06:59    


 


Random Glucose  78 mg/dL ()   03/17/18  06:59    


 


Calcium  7.7 mg/dL (8.5-10.1)  L  03/17/18  06:59    


 


Total Bilirubin  0.4 mg/dL (0.2-1.0)  D 03/17/18  06:59    


 


AST  15 U/L (15-37)   03/17/18  06:59    


 


ALT  43 U/L (12-78)   03/17/18  06:59    


 


Alkaline Phosphatase  169 U/L ()  H  03/17/18  06:59    


 


Total Protein  6.2 g/dl (6.4-8.2)  L  03/17/18  06:59    


 


Albumin  2.6 g/dl (3.4-5.0)  L  03/17/18  06:59    








 CARDIAC ENZYMES











Troponin I  < 0.02 ng/ml (0.00-0.05)   03/16/18  11:19    








Assessment: This is a 57 year old female with PMHx of paroxysmal a.fib (

diagnosed on prior admission 3/2018), pericarditis, who presented to the ED 

with epigastric pain and nausea. 





Plan: 


1) Paroxysmal A.fib with RVR


- Recurrent a.fib with RVR, which responded to digoxin loading, now back in NSR


- Continue amiodarone gtt


- On Heparin gtt, consider NOAC upon discharge


- Appreciate cardiology consult





2) Pericardial effusion


- Treated on last admittion 3/3-3/6 for acute pericarditis and discharged on 

colchicine and ibuprofen


- ECHO 3/15 with increase in pericardial effusion


- Serial ECHOs, f/u ESR/CRP


- No evidence of cardiac tamponade


- Continue to monitor closely





3) Epigastric pain


- MRCP 3/3 with cholelithiasis and no evidence of cholecystitis


- EGD tomorrow


- Protonix


- Abdominal ultrasound: thick walled gallbladder, acute cholecystitis cannot be 

excluded 


- Discussed with surgery, no evidence of acute cholecystitis, patient is 

asymptomatic, WBC wnl, afebrile. Will discontinue abx


- Appreciate GI consult: EGD tomorrow


- Appreciate surgery consult





4) F/E/N:


- Monitor electrolytes


- Regular diet for dinner


- NPO after midnight for EGD tomorrow


- IV fluids





5) Prophylaxis: 


- On Heparin gtt


- OOB ambulating





6) Dispo:


- Requires continued inpatient care





CODE STATUS: FULL CODE

## 2018-03-19 LAB
ALBUMIN SERPL-MCNC: 2.6 G/DL (ref 3.4–5)
ALP SERPL-CCNC: 131 U/L (ref 45–117)
ALT SERPL-CCNC: 30 U/L (ref 12–78)
ANION GAP SERPL CALC-SCNC: 10 MMOL/L (ref 8–16)
AST SERPL-CCNC: 13 U/L (ref 15–37)
BILIRUB SERPL-MCNC: 0.3 MG/DL (ref 0.2–1)
BUN SERPL-MCNC: 7 MG/DL (ref 7–18)
CALCIUM SERPL-MCNC: 7.7 MG/DL (ref 8.5–10.1)
CHLORIDE SERPL-SCNC: 105 MMOL/L (ref 98–107)
CO2 SERPL-SCNC: 26 MMOL/L (ref 21–32)
CREAT SERPL-MCNC: 0.5 MG/DL (ref 0.55–1.02)
DEPRECATED RDW RBC AUTO: 13.4 % (ref 11.6–15.6)
GLUCOSE SERPL-MCNC: 121 MG/DL (ref 74–106)
HCT VFR BLD CALC: 27.9 % (ref 32.4–45.2)
HGB BLD-MCNC: 9.6 GM/DL (ref 10.7–15.3)
INR BLD: 1.56 (ref 0.82–1.09)
MAGNESIUM SERPL-MCNC: 1.8 MG/DL (ref 1.8–2.4)
MCH RBC QN AUTO: 31.8 PG (ref 25.7–33.7)
MCHC RBC AUTO-ENTMCNC: 34.2 G/DL (ref 32–36)
MCV RBC: 92.9 FL (ref 80–96)
PLATELET # BLD AUTO: 314 K/MM3 (ref 134–434)
PMV BLD: 8.4 FL (ref 7.5–11.1)
POTASSIUM SERPLBLD-SCNC: 3 MMOL/L (ref 3.5–5.1)
PROT SERPL-MCNC: 6.1 G/DL (ref 6.4–8.2)
PT PNL PPP: 17.6 SEC (ref 9.98–11.88)
RBC # BLD AUTO: 3.01 M/MM3 (ref 3.6–5.2)
SODIUM SERPL-SCNC: 141 MMOL/L (ref 136–145)
WBC # BLD AUTO: 7.4 K/MM3 (ref 4–10)

## 2018-03-19 RX ADMIN — AMIODARONE HYDROCHLORIDE SCH MG: 200 TABLET ORAL at 21:56

## 2018-03-19 RX ADMIN — DEXTROSE SCH MLS/HR: 5 SOLUTION INTRAVENOUS at 04:07

## 2018-03-19 RX ADMIN — PANTOPRAZOLE SODIUM SCH MG: 40 INJECTION, POWDER, FOR SOLUTION INTRAVENOUS at 09:12

## 2018-03-19 RX ADMIN — ONDANSETRON PRN MG: 2 INJECTION INTRAMUSCULAR; INTRAVENOUS at 03:00

## 2018-03-19 RX ADMIN — POTASSIUM CHLORIDE SCH MLS/HR: 149 INJECTION, SOLUTION, CONCENTRATE INTRAVENOUS at 11:35

## 2018-03-19 RX ADMIN — POTASSIUM CHLORIDE SCH MLS/HR: 149 INJECTION, SOLUTION, CONCENTRATE INTRAVENOUS at 23:40

## 2018-03-19 RX ADMIN — POTASSIUM CHLORIDE SCH MLS/HR: 149 INJECTION, SOLUTION, CONCENTRATE INTRAVENOUS at 10:19

## 2018-03-19 RX ADMIN — DEXTROSE AND SODIUM CHLORIDE SCH MLS/HR: 5; 900 INJECTION, SOLUTION INTRAVENOUS at 09:22

## 2018-03-19 RX ADMIN — POTASSIUM CHLORIDE SCH MLS/HR: 149 INJECTION, SOLUTION, CONCENTRATE INTRAVENOUS at 22:38

## 2018-03-19 RX ADMIN — HEPARIN SODIUM SCH MLS/HR: 5000 INJECTION, SOLUTION INTRAVENOUS at 15:37

## 2018-03-19 NOTE — PN
Progress Note, Physician


Chief Complaint: 





Cardiology FU


Telem PAF rapid HR-converted to NSR.


Nausea and abdominal pain improved after Zofran.





History of Present Illness: 





57 year old woman with a PMHx of paroxysmal atrial fibrillation (not on 

anticoagulants), and pericarditis with small pericardial effusion admitted 3/15/

2018 with epigastric pain that began approximately 2 days prior to the 

admission. 





The patient was seen by GI. EGD planned. The patient developed recurrent atrial 

fibrillation, spontaneously cardioverted to sinus and relapsed with initially 

atrial flutter and changed to atrial fibrillation with rapid VR while receiving 

IV diltiazem, metoprolol and digoxin. 





Repeat echocardiogram 3/15/2018 showed normal LV size, wall motion and systolic 

function. LVEF = 70%. Normal RV. Normal LA and RA. Small and moderate (1-2 cm) 

pericardial effusion.





3/18/18: she had recurrent atrial fibrillation with rvr responded to dig load 

with amiodarone. 











- Current Medication List


Current Medications: 


Active Medications





Amiodarone HCl (Cordarone -)  400 mg PO TID [facility]


Heparin Sodium (Porcine) (Heparin -)  1,000 unit IVPUSH PRN PRN


   PRN Reason: Heparin


   Last Admin: 03/17/18 01:00 Dose:  1,000 unit


Heparin Sodium (Porcine) (Heparin -)  5,000 unit IVPUSH PRN PRN


   PRN Reason: Heparin


Heparin Sodium/Dextrose (Heparin Infusion -)  25,000 units in 500 mls @ 20 mls/

hr IVPB TITR SHAREE; 1,000 UNITS/HR


   PRN Reason: Protocol


   Stop: 03/20/18 02:00


   Last Admin: 03/18/18 16:38 Dose:  1,100 units/hr, 22 mls/hr


Dextrose/Sodium Chloride (D5-Ns -)  1,000 mls @ 75 mls/hr IV ASDIR SHAREE


   Last Admin: 03/19/18 09:22 Dose:  75 mls/hr


Morphine Sulfate (Morphine Sulfate)  2 mg IVPUSH Q4H PRN


   PRN Reason: PAIN LEVEL 7 - 10


   Last Admin: 03/19/18 10:12 Dose:  2 mg


Ondansetron HCl (Zofran Injection)  4 mg IVPUSH Q6H PRN


   PRN Reason: NAUSEA AND/OR VOMITING


   Last Admin: 03/19/18 03:00 Dose:  4 mg


Pantoprazole Sodium (Protonix Iv)  40 mg IVPUSH DAILY SHAREE


   Last Admin: 03/19/18 09:12 Dose:  40 mg











- Objective


Vital Signs: 


 Vital Signs











Temperature  98.5 F   03/19/18 14:00


 


Pulse Rate  58 L  03/19/18 14:00


 


Respiratory Rate  20   03/19/18 14:00


 


Blood Pressure  137/85   03/19/18 14:00


 


O2 Sat by Pulse Oximetry (%)  96   03/18/18 21:00











Constitutional: Yes: Well Nourished, No Distress, Calm


Eyes: Yes: Conjunctiva Clear


HENT: Yes: Atraumatic, Normocephalic


Neck: Yes: Supple, Trachea Midline


Cardiovascular: Yes: Regular Rate and Rhythm


Respiratory: Yes: Regular, CTA Bilaterally


Gastrointestinal: Yes: Normal Bowel Sounds, Soft


Extremities: Yes: WNL


Edema: No


Labs: 


 CBC, BMP





 03/19/18 06:30 





 03/19/18 06:30 





 INR, PTT











INR  1.56  (0.82-1.09)  H  03/19/18  09:00    








 Laboratory Tests











  03/19/18 03/19/18





  06:30 06:30


 


PTT (Actin FS)  53.3 H 


 


C-Reactive Protein   6.4 H


 


Albumin   2.6 L














Problem List





- Problems


(1) Atrial fibrillation with rapid ventricular response


Code(s): I48.91 - UNSPECIFIED ATRIAL FIBRILLATION   





(2) Pericardial effusion


Code(s): I31.3 - PERICARDIAL EFFUSION (NONINFLAMMATORY)   





(3) Afib


Code(s): I48.91 - UNSPECIFIED ATRIAL FIBRILLATION   





Assessment/Plan


57 F PAF and persistent epigastric abdominal pain with nausea. Treated for 

pericarditis with NSAID and colchicine.





1. Place on Amiodarone 400mg TID. 


* Telemetry


* IV heparin OK to hold pre EGD. 


* She has no cardiac contraindications to EGD.  This is a low risk procedure. 


 


2) Pericarditis with small to moderate pericardial effusion. 


* Will consider prednisone for pericardial effusion after GI evaluation, but 

would rather treat with colchicine and indomethacin.  Will reassess regimen 

post EGD.


* will follow serial echoes as well as esr and crp.


* would also consider rheumatology evaluation for serologies and workup.

## 2018-03-19 NOTE — PN
Physical Exam: 


SUBJECTIVE: 





Patient seen and examined at the bedside.


Very nauseous, uncomfortable, guarding her abdomen.


Unable to get comfortable.


Zofran not helping





OBJECTIVE:





Will give dose of Reglan now and see if it helps, may also try low dose ativan 

0.5mg for nausea if reglan ineffective


will increase frequency of morphine to 2mg from q6 to q4


Still on heparin drip and amiodorone drip


SR on cardiac monitor currently but rapid heart, afib with RVR overnight


K repleted with 2 K riders


 Vital Signs











 Period  Temp  Pulse  Resp  BP Sys/Nunez  Pulse Ox


 


 Last 24 Hr  98.2 F-98.8 F    18-18  120-141/57-89  96








GENERAL: The patient is awake, alert, and fully oriented,


HEAD: Normal with no signs of trauma.


EYES: PERRL, extraocular movements intact, sclera anicteric, conjunctiva clear. 

No ptosis. 


ENT: Ears normal, nares patent, oropharynx clear without exudates, moist mucous 

membranes.


NECK: Trachea midline, full range of motion, supple. 


LUNGS: Breath sounds equal,diminished on left base


HEART: SR on cardiac monitor


ABDOMEN: Soft, mildly distended, Pain to light palpation of epigastric area, 

guarding her abdomen


EXTREMITIES: 2+ pulses, warm, well-perfused, no edema. 


NEUROLOGICAL: Cranial nerves II through XII grossly intact. Normal speech, gait 

not observed.


PSYCH: Normal mood, normal affect.


SKIN: Warm, dry, normal turgor, no rashes or lesions noted


 Laboratory Results - last 24 hr











  03/19/18 03/19/18 03/19/18





  06:30 06:30 06:30


 


WBC  7.4  


 


RBC  3.01 L  


 


Hgb  9.6 L  


 


Hct  27.9 L  


 


MCV  92.9  


 


MCH  31.8  


 


MCHC  34.2  


 


RDW  13.4  


 


Plt Count  314  


 


MPV  8.4  


 


PTT (Actin FS)   53.3 H 


 


Sodium    141


 


Potassium    3.0 L


 


Chloride    105


 


Carbon Dioxide    26


 


Anion Gap    10


 


BUN    7


 


Creatinine    0.5 L


 


Creat Clearance w eGFR    > 60


 


Random Glucose    121 H


 


Calcium    7.7 L


 


Total Bilirubin    0.3  D


 


AST    13 L


 


ALT    30


 


Alkaline Phosphatase    131 H


 


C-Reactive Protein    6.4 H


 


Total Protein    6.1 L


 


Albumin    2.6 L








Active Medications











Generic Name Dose Route Start Last Admin





  Trade Name Freq  PRN Reason Stop Dose Admin


 


Heparin Sodium (Porcine)  1,000 unit  03/16/18 15:09  03/17/18 01:00





  Heparin -  IVPUSH   1,000 unit





  PRN PRN   Administration





  Heparin   


 


Heparin Sodium (Porcine)  5,000 unit  03/16/18 15:09  





  Heparin -  IVPUSH   





  PRN PRN   





  Heparin   


 


Heparin Sodium/Dextrose  25,000 units in 500 mls @ 20 mls/hr  03/16/18 15:15  03 /18/18 16:38





  Heparin Infusion -  IVPB   1,100 units/hr





  TITR SHAREE   22 mls/hr





  Protocol   Administration





  1,000 UNITS/HR   


 


Amiodarone HCl 450 mg/  250 mls @ 16.66 mls/hr  03/16/18 16:00  03/19/18 04:07





  Dextrose  IV   0.5 mg/min





  TITR SHAREE   16.66 mls/hr





  Protocol   Administration





  0.5 MG/MIN   


 


Dextrose/Sodium Chloride  1,000 mls @ 75 mls/hr  03/17/18 09:15  03/18/18 16:41





  D5-Ns -  IV   75 mls/hr





  ASDIR SHAREE   Administration


 


Potassium Chloride 10 meq/  105 mls @ 105 mls/hr  03/19/18 10:00  





  Sodium Chloride  IVPB  03/19/18 11:59  





  Q60M SHAREE   


 


Morphine Sulfate  2 mg  03/18/18 23:20  03/18/18 23:32





  Morphine Sulfate  IVPUSH   2 mg





  Q6H PRN   Administration





  PAIN LEVEL 7 - 10   


 


Ondansetron HCl  4 mg  03/15/18 18:22  03/19/18 03:00





  Zofran Injection  IVPUSH   4 mg





  Q6H PRN   Administration





  NAUSEA AND/OR VOMITING   


 


Pantoprazole Sodium  40 mg  03/17/18 10:00  03/19/18 09:12





  Protonix Iv  IVPUSH   40 mg





  DAILY SHAREE   Administration














ASSESSMENT/PLAN:





Patient is a 57 year old woman with a significant past medical history of 

hypertension.  She presents to the ED today with epigastric pain and nausea.  

She states the epigastric pain is constant, pressure type with 10/10 in 

intensity.  She is unable to eat, experiencing nausea.  Epigastric pain does 

not radiate but worsens with eating and is not associated with diaphoresis, 

chest pain or palpitations.  She denies lightheadedness.  Denies fever, cough, 

chills. Patient reports one episode of vomiting in hospital after her blood 

work was done. 





Patient was recently here between 3/3/2018 and 3/6/2018 for chest pain and was 

found to have new onset afib.  Patient was placed on a Cardizem drip and 

discharged on Lopressor for rate control  She was not anticoagulated as her 

CHADSVASC score was low.  Her chest pain was thought to be likely secondary to 

her pericarditis related to small pericardial effusion.   Patient was 

discharged with colchicine and ibuprofen taper per cardiology.  





Imaging:





3/3/18 MRCP - cholelithiasis, no evidence of cholecystitis. 


3/4/18 CTA  - no PE.  Mild bibasilar atelectatic changes and probably 

infiltrates with minimal mayela pleural effusion.   Pericardial effusion of 1.5cm 

width noted along Left inferior margin


3/5/18 Echo -LVEF 65-70%.  Left ventricle normal in size and systolic function.

  Right ventricle systolic function normal.  No regional wall motion 

abnormalities.  Moderate MR/TR.  Small pericardial effusion, < 1cm.  


3/15/2018: abd u/s cholelithiasis with thick walled gallbladder, acute 

cholecystitis cannot be excluded


3/15/2018: Echo: larger pericardial effusion compared to last visit





Chest xray 3/15: large heart, left base/costrophrenic angle changes





Cardiology:


Prox. afib with rapid RVR


Had episode of afib with RVR yesterday


On Amiodorone drip


On Heparin drip


Heparin to be held at 0200 3/20/18 for EGD


CTA on 3/4, negative for PE





Pericardial effusion, Echo 3/15 shows increase in pericardial effusion


Discussed with cardiologist this morning





GI:


Abdominal pain/epigastric pain


Ultrasound noted above


NPO


Hydration


Protonix IV


Monitor labs, vitals


Cardiac monitoring


Surgery and GI evaluation


EGD tomorrow





F.E.N.


Fluids: NS @ 75


Electrolytes: hypoK repleted with 2 K riders


Nutrition: NPO





DVT: heparin drip


GI: Protonix IV


Incentive spirometer





full code

## 2018-03-19 NOTE — PN
Progress Note, Physician


History of Present Illness: 





Chart reviewed.


Nauseous this am, minimal response to zofran. Preglan is being prescribed


On amiodarone, and heparin drips. 


Cleared by cardiology for EGD





- Current Medication List


Current Medications: 


Active Medications





Heparin Sodium (Porcine) (Heparin -)  1,000 unit IVPUSH PRN PRN


   PRN Reason: Heparin


   Last Admin: 03/17/18 01:00 Dose:  1,000 unit


Heparin Sodium (Porcine) (Heparin -)  5,000 unit IVPUSH PRN PRN


   PRN Reason: Heparin


Heparin Sodium/Dextrose (Heparin Infusion -)  25,000 units in 500 mls @ 20 mls/

hr IVPB TITR SHAREE; 1,000 UNITS/HR


   PRN Reason: Protocol


   Last Admin: 03/18/18 16:38 Dose:  1,100 units/hr, 22 mls/hr


Amiodarone HCl 450 mg/ (Dextrose)  250 mls @ 16.66 mls/hr IV TITR SHAREE; 0.5 MG/

MIN


   PRN Reason: Protocol


   Last Admin: 03/19/18 04:07 Dose:  0.5 mg/min, 16.66 mls/hr


Dextrose/Sodium Chloride (D5-Ns -)  1,000 mls @ 75 mls/hr IV ASDIR SHAREE


   Last Admin: 03/19/18 09:22 Dose:  75 mls/hr


Morphine Sulfate (Morphine Sulfate)  2 mg IVPUSH Q4H PRN


   PRN Reason: PAIN LEVEL 7 - 10


   Last Admin: 03/19/18 10:12 Dose:  2 mg


Ondansetron HCl (Zofran Injection)  4 mg IVPUSH Q6H PRN


   PRN Reason: NAUSEA AND/OR VOMITING


   Last Admin: 03/19/18 03:00 Dose:  4 mg


Pantoprazole Sodium (Protonix Iv)  40 mg IVPUSH DAILY SHAREE


   Last Admin: 03/19/18 09:12 Dose:  40 mg











- Objective


Vital Signs: 


 Vital Signs











Temperature  98 F   03/19/18 09:00


 


Pulse Rate  64   03/19/18 09:00


 


Respiratory Rate  18   03/19/18 09:00


 


Blood Pressure  140/60   03/19/18 09:00


 


O2 Sat by Pulse Oximetry (%)  96   03/18/18 21:00











Constitutional: Yes: Anxious, Mild Distress (nausea-related)


Gastrointestinal: Yes: Soft, Tenderness, Epigastrium


Neurological: Yes: Alert, Oriented


Labs: 


 CBC, BMP





 03/19/18 06:30 





 03/19/18 06:30 





 INR, PTT











INR  1.56  (0.82-1.09)  H  03/19/18  09:00    








 CBCD











WBC  7.4 K/mm3 (4.0-10.0)   03/19/18  06:30    


 


RBC  3.01 M/mm3 (3.60-5.2)  L  03/19/18  06:30    


 


Hgb  9.6 GM/dL (10.7-15.3)  L  03/19/18  06:30    


 


Hct  27.9 % (32.4-45.2)  L  03/19/18  06:30    


 


MCV  92.9 fl (80-96)   03/19/18  06:30    


 


MCHC  34.2 g/dl (32.0-36.0)   03/19/18  06:30    


 


RDW  13.4 % (11.6-15.6)   03/19/18  06:30    


 


Plt Count  314 K/MM3 (134-434)   03/19/18  06:30    


 


MPV  8.4 fl (7.5-11.1)   03/19/18  06:30    








 CMP











Sodium  141 mmol/L (136-145)   03/19/18  06:30    


 


Potassium  3.0 mmol/L (3.5-5.1)  L  03/19/18  06:30    


 


Chloride  105 mmol/L ()   03/19/18  06:30    


 


Carbon Dioxide  26 mmol/L (21-32)   03/19/18  06:30    


 


Anion Gap  10  (8-16)   03/19/18  06:30    


 


BUN  7 mg/dL (7-18)   03/19/18  06:30    


 


Creatinine  0.5 mg/dL (0.55-1.02)  L  03/19/18  06:30    


 


Creat Clearance w eGFR  > 60  (>60)   03/19/18  06:30    


 


Calcium  7.7 mg/dL (8.5-10.1)  L  03/19/18  06:30    


 


Total Bilirubin  0.3 mg/dL (0.2-1.0)  D 03/19/18  06:30    


 


AST  13 U/L (15-37)  L  03/19/18  06:30    


 


ALT  30 U/L (12-78)   03/19/18  06:30    


 


Alkaline Phosphatase  131 U/L ()  H  03/19/18  06:30    


 


Total Protein  6.1 g/dl (6.4-8.2)  L  03/19/18  06:30    


 


Albumin  2.6 g/dl (3.4-5.0)  L  03/19/18  06:30    














Problem List





- Problems


(1) Cholecystitis


Code(s): K81.9 - CHOLECYSTITIS, UNSPECIFIED   





(2) Atrial fibrillation with rapid ventricular response


Code(s): I48.91 - UNSPECIFIED ATRIAL FIBRILLATION   





(3) Epigastric abdominal pain


Code(s): R10.13 - EPIGASTRIC PAIN   





(4) Afib


Code(s): I48.91 - UNSPECIFIED ATRIAL FIBRILLATION   





(5) Cholelithiasis


Code(s): K80.20 - CALCULUS OF GALLBLADDER W/O CHOLECYSTITIS W/O OBSTRUCTION   





Assessment/Plan





Plan EGD in am, if remains stable from cardiology perspective. Hold heparin 4 

hrs prior to EGD was discussed with primary team

## 2018-03-20 LAB
ALBUMIN SERPL-MCNC: 3 G/DL (ref 3.4–5)
ALP SERPL-CCNC: 140 U/L (ref 45–117)
ALT SERPL-CCNC: 40 U/L (ref 12–78)
ANION GAP SERPL CALC-SCNC: 5 MMOL/L (ref 8–16)
AST SERPL-CCNC: 31 U/L (ref 15–37)
BASOPHILS # BLD: 0.7 % (ref 0–2)
BILIRUB SERPL-MCNC: 0.3 MG/DL (ref 0.2–1)
BUN SERPL-MCNC: 4 MG/DL (ref 7–18)
CALCIUM SERPL-MCNC: 7.1 MG/DL (ref 8.5–10.1)
CHLORIDE SERPL-SCNC: 106 MMOL/L (ref 98–107)
CO2 SERPL-SCNC: 29 MMOL/L (ref 21–32)
CREAT SERPL-MCNC: 0.5 MG/DL (ref 0.55–1.02)
DEPRECATED RDW RBC AUTO: 13.5 % (ref 11.6–15.6)
DEPRECATED RDW RBC AUTO: 13.8 % (ref 11.6–15.6)
EOSINOPHIL # BLD: 1 % (ref 0–4.5)
GLUCOSE SERPL-MCNC: 85 MG/DL (ref 74–106)
HCT VFR BLD CALC: 28.8 % (ref 32.4–45.2)
HCT VFR BLD CALC: 31.5 % (ref 32.4–45.2)
HGB BLD-MCNC: 10 GM/DL (ref 10.7–15.3)
HGB BLD-MCNC: 10.8 GM/DL (ref 10.7–15.3)
INR BLD: 1.58 (ref 0.82–1.09)
LYMPHOCYTES # BLD: 14.1 % (ref 8–40)
MAGNESIUM SERPL-MCNC: 1.9 MG/DL (ref 1.8–2.4)
MCH RBC QN AUTO: 31.6 PG (ref 25.7–33.7)
MCH RBC QN AUTO: 31.9 PG (ref 25.7–33.7)
MCHC RBC AUTO-ENTMCNC: 34.3 G/DL (ref 32–36)
MCHC RBC AUTO-ENTMCNC: 34.7 G/DL (ref 32–36)
MCV RBC: 91.9 FL (ref 80–96)
MCV RBC: 92.1 FL (ref 80–96)
MONOCYTES # BLD AUTO: 10.3 % (ref 3.8–10.2)
NEUTROPHILS # BLD: 73.9 % (ref 42.8–82.8)
PLATELET # BLD AUTO: 338 K/MM3 (ref 134–434)
PLATELET # BLD AUTO: 357 K/MM3 (ref 134–434)
PMV BLD: 7.7 FL (ref 7.5–11.1)
PMV BLD: 8.3 FL (ref 7.5–11.1)
POTASSIUM SERPLBLD-SCNC: 3.2 MMOL/L (ref 3.5–5.1)
PROT SERPL-MCNC: 6.7 G/DL (ref 6.4–8.2)
PT PNL PPP: 17.8 SEC (ref 9.98–11.88)
RBC # BLD AUTO: 3.13 M/MM3 (ref 3.6–5.2)
RBC # BLD AUTO: 3.42 M/MM3 (ref 3.6–5.2)
SODIUM SERPL-SCNC: 140 MMOL/L (ref 136–145)
WBC # BLD AUTO: 7 K/MM3 (ref 4–10)
WBC # BLD AUTO: 7.1 K/MM3 (ref 4–10)

## 2018-03-20 PROCEDURE — 0DD68ZX EXTRACTION OF STOMACH, VIA NATURAL OR ARTIFICIAL OPENING ENDOSCOPIC, DIAGNOSTIC: ICD-10-PCS | Performed by: INTERNAL MEDICINE

## 2018-03-20 PROCEDURE — 0DD98ZX EXTRACTION OF DUODENUM, VIA NATURAL OR ARTIFICIAL OPENING ENDOSCOPIC, DIAGNOSTIC: ICD-10-PCS | Performed by: INTERNAL MEDICINE

## 2018-03-20 RX ADMIN — POTASSIUM CHLORIDE ONE MEQ: 1500 TABLET, EXTENDED RELEASE ORAL at 11:59

## 2018-03-20 RX ADMIN — AMIODARONE HYDROCHLORIDE SCH MG: 200 TABLET ORAL at 13:34

## 2018-03-20 RX ADMIN — PANTOPRAZOLE SODIUM SCH MG: 40 INJECTION, POWDER, FOR SOLUTION INTRAVENOUS at 09:14

## 2018-03-20 RX ADMIN — AMIODARONE HYDROCHLORIDE SCH MG: 200 TABLET ORAL at 21:49

## 2018-03-20 RX ADMIN — AMIODARONE HYDROCHLORIDE SCH MG: 200 TABLET ORAL at 06:02

## 2018-03-20 NOTE — PROC
Endoscopy Procedure


Endoscopy procedure completed. Please see scanned procedure report.





Normal EGD, random biopsies taken. Follow result in 1 week in office.

## 2018-03-20 NOTE — PN
Physical Exam: 


SUBJECTIVE: Patient seen and examined at the bedside.








OBJECTIVE:





Cost for Coumadin 5mg x 30 pills is $15.00 at [facility]


 Vital Signs











 Period  Temp  Pulse  Resp  BP Sys/Nunez  Pulse Ox


 


 Last 24 Hr  98 F-99.3 F    16-20  115-150/70-94  








GENERAL: The patient is awake, alert, and fully oriented,


HEAD: Normal with no signs of trauma.


EYES: PERRL, extraocular movements intact, sclera anicteric, conjunctiva clear. 

No ptosis. 


ENT: Ears normal, nares patent, oropharynx clear without exudates, moist mucous 

membranes.


NECK: Trachea midline, full range of motion, supple. 


LUNGS: Breath sounds equal,diminished on left base


HEART: SR on cardiac monitor


ABDOMEN: Soft, mildly distended, Pain to light palpation of epigastric area, 

guarding her abdomen


EXTREMITIES: 2+ pulses, warm, well-perfused, no edema. 


NEUROLOGICAL: Cranial nerves II through XII grossly intact. Normal speech, gait 

not observed.


PSYCH: Normal mood, normal affect.


SKIN: Warm, dry, normal turgor, no rashes or lesions noted


 Laboratory Results - last 24 hr











  03/19/18 03/19/18 03/19/18





  06:30 06:30 06:30


 


WBC   


 


RBC   


 


Hgb   


 


Hct   


 


MCV   


 


MCH   


 


MCHC   


 


RDW   


 


Plt Count   


 


MPV   


 


ESR   47 H 


 


PTT (Actin FS)   


 


Sodium  141  


 


Potassium  3.0 L  


 


Chloride  105  


 


Carbon Dioxide  26  


 


Anion Gap  10  


 


BUN  7  


 


Creatinine  0.5 L  


 


Creat Clearance w eGFR  > 60  


 


Random Glucose  121 H  


 


Calcium  7.7 L  


 


Magnesium  1.8   Cancelled


 


Total Bilirubin  0.3  D  


 


AST  13 L  


 


ALT  30  


 


Alkaline Phosphatase  131 H  


 


C-Reactive Protein  6.4 H  


 


Total Protein  6.1 L  


 


Albumin  2.6 L  














  03/19/18 03/20/18 03/20/18





  17:00 06:45 06:45


 


WBC   7.1 


 


RBC   3.13 L 


 


Hgb   10.0 L 


 


Hct   28.8 L 


 


MCV   91.9 


 


MCH   31.9 


 


MCHC   34.7 


 


RDW   13.5 


 


Plt Count   338 


 


MPV   8.3 


 


ESR   


 


PTT (Actin FS)    40.2 H


 


Sodium   


 


Potassium  3.3 L  


 


Chloride   


 


Carbon Dioxide   


 


Anion Gap   


 


BUN   


 


Creatinine   


 


Creat Clearance w eGFR   


 


Random Glucose   


 


Calcium   


 


Magnesium   


 


Total Bilirubin   


 


AST   


 


ALT   


 


Alkaline Phosphatase   


 


C-Reactive Protein   


 


Total Protein   


 


Albumin   








Active Medications











Generic Name Dose Route Start Last Admin





  Trade Name Freq  PRN Reason Stop Dose Admin


 


Amiodarone HCl  400 mg  03/19/18 15:40  03/20/18 06:02





  Cordarone -  PO   400 mg





  TID [facility]   Administration


 


Heparin Sodium (Porcine)  1,000 unit  03/20/18 08:22  





  Heparin -  IVPUSH   





  PRN PRN   





  Heparin   


 


Heparin Sodium (Porcine)  5,000 unit  03/20/18 08:22  





  Heparin -  IVPUSH   





  PRN PRN   





  Heparin   


 


Heparin Sodium (Porcine) 25,  500 mls @ 16 mls/hr  03/20/18 08:30  





  000 unit/ Sodium Chloride  IV   





  TITR SHAREE   





  Protocol   





  800 UNIT/HR   


 


Metoclopramide HCl  10 mg  03/19/18 16:05  





  Reglan Injection -  IVPUSH   





  Q8H PRN   





  NAUSEA AND/OR VOMITING   


 


Morphine Sulfate  2 mg  03/19/18 09:32  03/19/18 10:12





  Morphine Sulfate  IVPUSH   2 mg





  Q4H PRN   Administration





  PAIN LEVEL 7 - 10   


 


Pantoprazole Sodium  40 mg  03/17/18 10:00  03/20/18 09:14





  Protonix Iv  IVPUSH   40 mg





  DAILY SHAREE   Administration











ASSESSMENT/PLAN:





Patient is a 57 year old woman with a significant past medical history of 

hypertension.  She presents to the ED with epigastric pain and nausea.   She is 

unable to eat, experiencing nausea.  Epigastric pain does not radiate but 

worsens with eating and is not associated with diaphoresis, chest pain or 

palpitations.  She denies lightheadedness.  





Patient was recently here between 3/3/2018 and 3/6/2018 for chest pain and was 

found to have new onset afib.  Patient was placed on a Cardizem drip and 

discharged on Lopressor for rate control  She was not anticoagulated as her 

CHADSVASC score was low.  Her chest pain was thought to be likely secondary to 

her pericarditis related to small pericardial effusion.   On last admission, 

patient was discharged with colchicine and ibuprofen taper per her cardiologist 





Imaging:





3/3/18 MRCP - cholelithiasis, no evidence of cholecystitis. 


3/4/18 CTA  - no PE.  Mild bibasilar atelectatic changes and probably 

infiltrates with minimal mayela pleural effusion.   Pericardial effusion of 1.5cm 

width noted along Left inferior margin


3/5/18 Echo -LVEF 65-70%.  Left ventricle normal in size and systolic function.

  Right ventricle systolic function normal.  No regional wall motion 

abnormalities.  Moderate MR/TR.  Small pericardial effusion, < 1cm.  


3/15/2018: abd u/s cholelithiasis with thick walled gallbladder, acute 

cholecystitis cannot be excluded


3/15/2018: Echo: larger pericardial effusion compared to last visit





Chest xray 3/15: large heart, left base/costrophrenic angle changes





Cardiology:


Prox. afib with rapid RVR


Currently rate controlled on Amiodorone 400mg TID


Heparin drip stopped and patient now on Coumadin 5mg


As per cardiologist, pt does not need to be bridged to Coumadin, goal INR is 2-3


CTA on 3/4/18, negative for PE


cardiac monitor x 24 more hours





Pericardial effusion, Echo 3/15 shows increase in pericardial effusion


Discussed with cardiologist, will need repeat echocardiogram in 2 weeks.  


Patient follows Dr. Montgomery





GI:


Abdominal pain/epigastric pain


EGD negative


Start on clears and advance as tolerated


On Reglan PRN


Monitor labs, vitals





Surgical evaluation once patient has repeat echo and cleared by cardiology


Dr. Box following





F.E.N.


Fluids: tolerating PO


Electrolytes: hypoK repleted


Nutrition: clears





Prophylaxis:


DVT:  Coumadin 5mg daily


GI: Protonix IV


Incentive spirometer





full code




















Visit type





- Emergency Visit


Emergency Visit: Yes


ED Registration Date: 03/15/18


Care time: The patient presented to the Emergency Department on the above date 

and was hospitalized for further evaluation of their emergent condition.





- New Patient


This patient is new to me today: No





- Critical Care


Critical Care patient: No





- Discharge Referral


Referred to [facility] No

## 2018-03-20 NOTE — PN
Progress Note, Physician


Chief Complaint: 





Cardiology FU





Breif PAF rapid HR-converted to NSR.


No abdominal pain or nausea.


EGD negative.





History of Present Illness: 





57 year old woman with a PMHx of paroxysmal atrial fibrillation (not on 

anticoagulants), and presumed pericarditis with small pericardial effusion 

admitted 3/15/2018 with epigastric pain that began approximately 2 days prior 

to the admission. 





The patient was seen by GI. EGD planned. The patient developed recurrent atrial 

fibrillation, spontaneously cardioverted to sinus and relapsed with initially 

atrial flutter and changed to atrial fibrillation with rapid VR while receiving 

IV diltiazem, metoprolol and digoxin. 





Repeat echocardiogram 3/15/2018 showed normal LV size, wall motion and systolic 

function. LVEF = 70%. Normal RV. Normal LA and RA. Small and moderate (1-2 cm) 

pericardial effusion.














- Current Medication List


Current Medications: 


Active Medications





Amiodarone HCl (Cordarone -)  400 mg PO TID [facility]


   Last Admin: 03/20/18 06:02 Dose:  400 mg


Heparin Sodium (Porcine) (Heparin -)  1,000 unit IVPUSH PRN PRN


   PRN Reason: Heparin


Heparin Sodium (Porcine) (Heparin -)  5,000 unit IVPUSH PRN PRN


   PRN Reason: Heparin


Heparin Sodium (Porcine) 25, (000 unit/ Sodium Chloride)  500 mls @ 16 mls/hr 

IV TITR SHAREE; 800 UNIT/HR


   PRN Reason: Protocol


Metoclopramide HCl (Reglan Injection -)  10 mg IVPUSH Q8H PRN


   PRN Reason: NAUSEA AND/OR VOMITING


Morphine Sulfate (Morphine Sulfate)  2 mg IVPUSH Q4H PRN


   PRN Reason: PAIN LEVEL 7 - 10


   Last Admin: 03/19/18 10:12 Dose:  2 mg


Pantoprazole Sodium (Protonix Iv)  40 mg IVPUSH DAILY [facility]


   Last Admin: 03/20/18 09:14 Dose:  40 mg











- Objective


Vital Signs: 


 Vital Signs











Temperature  98.8 F   03/20/18 08:13


 


Pulse Rate  61   03/20/18 08:48


 


Respiratory Rate  16   03/20/18 08:48


 


Blood Pressure  124/83   03/20/18 08:48


 


O2 Sat by Pulse Oximetry (%)  96   03/20/18 08:48











Constitutional: Yes: Well Nourished, No Distress, Calm


Eyes: Yes: Conjunctiva Clear


HENT: Yes: Atraumatic, Normocephalic


Neck: Yes: Supple, Trachea Midline


Cardiovascular: Yes: Regular Rate and Rhythm.  No: JVD, Murmur, Rub


Respiratory: Yes: Regular, CTA Bilaterally


Gastrointestinal: Yes: Normal Bowel Sounds, Soft.  No: Distention, Hyperactive 

Bowel Sounds, Palpable Mass, Tenderness


Extremities: Yes: WNL


Edema: No


Labs: 


 CBC, BMP





 03/20/18 06:45 





 03/19/18 17:00 





 INR, PTT











INR  1.56  (0.82-1.09)  H  03/19/18  09:00    














Problem List





- Problems


(1) Atrial fibrillation with rapid ventricular response


Code(s): I48.91 - UNSPECIFIED ATRIAL FIBRILLATION   





(2) Pericardial effusion


Code(s): I31.3 - PERICARDIAL EFFUSION (NONINFLAMMATORY)   





(3) Afib


Code(s): I48.91 - UNSPECIFIED ATRIAL FIBRILLATION   





Assessment/Plan


57 F PAF and persistent epigastric abdominal pain with nausea. Treated for 

pericarditis with NSAID and colchicine.





1. Afib frequency has improved with Amiodarone 400mg TID. 


* Follow LFT


* ECG today


* Place on coumadin if unable to secure NOAC as an out patient. Follow INR as 

out patient with a goal of 2-3 on coumadin.


* Continue telemetry for 24 hours.





 


2) possible Pericarditis with small to moderate pericardial effusion. 


* Given improvement in symptoms, and resolution of pain off NSAID/colchesine, 

will hold off on further medication at this time. No ECG changes to suggest 

pericardial inflammation.


* Repeat echocardiogram in 2 weeks

## 2018-03-21 VITALS — TEMPERATURE: 98.2 F | HEART RATE: 74 BPM

## 2018-03-21 VITALS — SYSTOLIC BLOOD PRESSURE: 123 MMHG | DIASTOLIC BLOOD PRESSURE: 78 MMHG

## 2018-03-21 LAB
ALBUMIN SERPL-MCNC: 3 G/DL (ref 3.4–5)
ALP SERPL-CCNC: 137 U/L (ref 45–117)
ALT SERPL-CCNC: 41 U/L (ref 12–78)
ANION GAP SERPL CALC-SCNC: 11 MMOL/L (ref 8–16)
AST SERPL-CCNC: 28 U/L (ref 15–37)
BASOPHILS # BLD: 0.5 % (ref 0–2)
BILIRUB SERPL-MCNC: 0.5 MG/DL (ref 0.2–1)
BUN SERPL-MCNC: 5 MG/DL (ref 7–18)
CALCIUM SERPL-MCNC: 8.4 MG/DL (ref 8.5–10.1)
CHLORIDE SERPL-SCNC: 103 MMOL/L (ref 98–107)
CO2 SERPL-SCNC: 26 MMOL/L (ref 21–32)
CREAT SERPL-MCNC: 0.5 MG/DL (ref 0.55–1.02)
DEPRECATED RDW RBC AUTO: 13.8 % (ref 11.6–15.6)
DEPRECATED RDW RBC AUTO: 13.9 % (ref 11.6–15.6)
EOSINOPHIL # BLD: 1.7 % (ref 0–4.5)
GLUCOSE SERPL-MCNC: 71 MG/DL (ref 74–106)
HCT VFR BLD CALC: 31.3 % (ref 32.4–45.2)
HCT VFR BLD CALC: 31.9 % (ref 32.4–45.2)
HGB BLD-MCNC: 10.9 GM/DL (ref 10.7–15.3)
HGB BLD-MCNC: 10.9 GM/DL (ref 10.7–15.3)
INR BLD: 2.92 (ref 0.82–1.09)
LYMPHOCYTES # BLD: 16.1 % (ref 8–40)
MAGNESIUM SERPL-MCNC: 2.3 MG/DL (ref 1.8–2.4)
MCH RBC QN AUTO: 31.5 PG (ref 25.7–33.7)
MCH RBC QN AUTO: 31.9 PG (ref 25.7–33.7)
MCHC RBC AUTO-ENTMCNC: 34.1 G/DL (ref 32–36)
MCHC RBC AUTO-ENTMCNC: 34.9 G/DL (ref 32–36)
MCV RBC: 91.6 FL (ref 80–96)
MCV RBC: 92.3 FL (ref 80–96)
MONOCYTES # BLD AUTO: 10.7 % (ref 3.8–10.2)
NEUTROPHILS # BLD: 71 % (ref 42.8–82.8)
PLATELET # BLD AUTO: 408 K/MM3 (ref 134–434)
PLATELET # BLD AUTO: 439 K/MM3 (ref 134–434)
PMV BLD: 8 FL (ref 7.5–11.1)
PMV BLD: 8.1 FL (ref 7.5–11.1)
POTASSIUM SERPLBLD-SCNC: 3.5 MMOL/L (ref 3.5–5.1)
PROT SERPL-MCNC: 6.7 G/DL (ref 6.4–8.2)
PT PNL PPP: 33 SEC (ref 9.98–11.88)
RBC # BLD AUTO: 3.42 M/MM3 (ref 3.6–5.2)
RBC # BLD AUTO: 3.46 M/MM3 (ref 3.6–5.2)
SODIUM SERPL-SCNC: 140 MMOL/L (ref 136–145)
WBC # BLD AUTO: 7 K/MM3 (ref 4–10)
WBC # BLD AUTO: 7.2 K/MM3 (ref 4–10)

## 2018-03-21 RX ADMIN — AMIODARONE HYDROCHLORIDE SCH MG: 200 TABLET ORAL at 05:47

## 2018-03-21 RX ADMIN — PANTOPRAZOLE SODIUM SCH MG: 40 INJECTION, POWDER, FOR SOLUTION INTRAVENOUS at 10:04

## 2018-03-21 NOTE — PATH
Surgical Pathology Report



Patient Name:  ROGE BRASWELL

Accession #:  [id number]

[facility]. Rec. #:  [id number]                                                        

   /Age/Gender:  1960 (Age: 57) / F

Account:  [id number]                                                          

             Location: 4  TELEMETRY U

Taken:  3/20/2018

Received:  3/20/2018

Reported:  3/21/2018

Physicians:  AZ Nesbitt F.N.P.

  



Specimen(s) Received

A: BX 2ND PORTION OF DUODENUM 

B: BX ANTRUM 





Clinical History

Preoperative diagnosis: Abdominal pain

Postoperative diagnosis: Normal upper endoscopy







Final Diagnosis

A. DUODENUM, SECOND PORTION, BIOPSY:

DUODENAL MUCOSA WITHOUT SIGNIFICANT PATHOLOGIC FINDINGS.



B. STOMACH, ANTRUM, BIOPSY:

GASTRIC ANTRAL MUCOSA WITH MODERATE CHRONIC GASTRITIS. IMMUNOHISTOCHEMICAL STAIN

FOR H. PYLORI IS NEGATIVE.







***Electronically Signed***

Bridget Pimentel M.D.





Gross Description

A.  Received in formalin, labeled "biopsy second portion of duodenum" is a tan,

irregular portion of soft tissue measuring 0.3 cm. in greatest dimension. The

specimen is submitted in toto in one cassette.



B.  Received in formalin, labeled "biopsy antrum" are 2 tan, irregular portions

of soft tissue measuring 0.3 and 0.4 cm. in greatest dimension. The specimens

are submitted in toto in one cassette.

/3/20/2018



saudi/3/20/2018

## 2018-03-21 NOTE — DS
Physical Exam: 


SUBJECTIVE: Patient seen and examined








OBJECTIVE:





 Vital Signs











 Period  Temp  Pulse  Resp  BP Sys/Nunez  Pulse Ox


 


 Last 24 Hr  98.2 F-99 F  61-74  20-20  114-132/65-85  95-95








PHYSICAL EXAM





GENERAL: The patient is awake, alert, and fully oriented, in no acute distress.


HEAD: Normal with no signs of trauma.


EYES: PERRL, extraocular movements intact, sclera anicteric, conjunctiva clear. 


ENT: Ears normal, nares patent, oropharynx clear without exudates, moist mucous 

membranes.


NECK: Trachea midline, full range of motion, supple. 


LUNGS: Breath sounds equal, clear to auscultation bilaterally, no wheezes, no 

crackles, no accessory muscle use. 


HEART: Regular rate and rhythm, S1, S2 without murmur, rub or gallop.


ABDOMEN: Soft, nontender, nondistended, normoactive bowel sounds, no guarding, 

no rebound, no hepatosplenomegaly, no masses.


EXTREMITIES: 2+ pulses, warm, well-perfused, no edema. 


NEUROLOGICAL: Cranial nerves II through XII grossly intact. Normal speech, gait 

not observed.


PSYCH: Normal mood, normal affect.


SKIN: Warm, dry, normal turgor, no rashes or lesions noted.





LABS


 Laboratory Results - last 24 hr











  03/20/18 03/20/18 03/20/18





  10:05 10:05 10:05


 


WBC  7.0  


 


RBC  3.42 L  


 


Hgb  10.8  


 


Hct  31.5 L  


 


MCV  92.1  


 


MCH  31.6  


 


MCHC  34.3  


 


RDW  13.8  


 


Plt Count  357  


 


MPV  7.7  


 


Neutrophils %  73.9  


 


Lymphocytes %  14.1  


 


Monocytes %  10.3 H  


 


Eosinophils %  1.0  


 


Basophils %  0.7  


 


PT with INR    17.80 H


 


INR    1.58 H


 


PTT (Actin FS)   


 


Sodium   140 


 


Potassium   3.2 L 


 


Chloride   106 


 


Carbon Dioxide   29 


 


Anion Gap   5 L 


 


BUN   4 L 


 


Creatinine   0.5 L 


 


Creat Clearance w eGFR   > 60 


 


Random Glucose   85 


 


Calcium   7.1 L 


 


Magnesium   1.9 


 


Total Bilirubin   0.3 


 


AST   31 


 


ALT   40 


 


Alkaline Phosphatase   140 H 


 


Total Protein   6.7 


 


Albumin   3.0 L 














  03/21/18 03/21/18





  05:00 05:00


 


WBC  7.0 


 


RBC  3.42 L 


 


Hgb  10.9 


 


Hct  31.3 L 


 


MCV  91.6 


 


MCH  31.9 


 


MCHC  34.9 


 


RDW  13.9 


 


Plt Count  439 H D 


 


MPV  8.1 


 


Neutrophils %  


 


Lymphocytes %  


 


Monocytes %  


 


Eosinophils %  


 


Basophils %  


 


PT with INR  


 


INR  


 


PTT (Actin FS)   37.1 H


 


Sodium  


 


Potassium  


 


Chloride  


 


Carbon Dioxide  


 


Anion Gap  


 


BUN  


 


Creatinine  


 


Creat Clearance w eGFR  


 


Random Glucose  


 


Calcium  


 


Magnesium  


 


Total Bilirubin  


 


AST  


 


ALT  


 


Alkaline Phosphatase  


 


Total Protein  


 


Albumin  











HOSPITAL COURSE:





Date of Admission:03/15/18





Date of Discharge: 03/21/18








Assessment: This is a 57 year old female with PMHx of paroxysmal a.fib (

diagnosed on prior admission 3/2018), pericarditis, who presented to the ED 

with epigastric pain and nausea. 





1) Paroxysmal A.fib with RVR


- Recurrent a.fib with RVR, which responded to digoxin loading and converted to 

SR 


- Treated with amiodarone gtt, then transitioned to PO 400mg TID, presently rate

-controlled 


- No insurance coverage for NOAC, started on warfarin


- Outpatient followup with Dr. Olson at [facility] for INR checks





2) Pericarditis with pericardial effusion


- Treated on last admission 3/3-3/6 for acute pericarditis 


- Echo 3/15/2018 showed normal LV size, wall motion and systolic function; LVEF 

70%; small to moderate (1-2 cm) pericardial effusion (increase from previous)


- No evidence of cardiac tamponade


- Per cardiology, will need repeat echo in 2 weeks with Dr. Montgomery's office


- no anti-inflammatories on discharge as symptoms have resolved








3) Epigastric pain


- MRCP 3/3 with cholelithiasis and no evidence of cholecystitis


- 3/15/2018: abd u/s cholelithiasis with thick walled gallbladder, acute 

cholecystitis cannot be excluded


- EGD 3/20/18: unremarkable study


- seen and evaluated by surgeon Dr. Box; patient will need repeat echo and 

clearance by cardiology prior to possible surgical intervention for 

cholecystitis











Minutes to complete discharge: 35





Discharge Summary


Reason For Visit: PERICARDIAL EFFUSION


Current Active Problems





Atrial fibrillation with rapid ventricular response (Acute)


Cholecystitis (Acute)


Epigastric abdominal pain (Acute)


Pericardial effusion (Acute)








Condition: Improved





- Instructions


Referrals: 


Wei Montgomery MD [Staff Physician] - 2 Weeks (repeat echocardiogram in two 

weeks to follow up on pericardial effusion)


Fabricio Olson MD [Staff Physician] - 03/23/18 1:30 pm (INR check)


Brent Box MD [Staff Physician] - 1 Month


Disposition: HOME





- Home Medications


Comprehensive Discharge Medication List: 


Ambulatory Orders





Colchicine [Colcrys -] 0.6 mg PO DAILY #30 tablet 03/06/18 


Ibuprofen [Motrin -] 600 mg PO TID #60 tablet 03/06/18 


Metoprolol Tartrate [Lopressor -] 25 mg PO BID #60 tablet 03/06/18 


Omeprazole 40 mg PO DAILY 03/15/18 








This patient is new to me today: Yes


Date on this admission: 03/21/18


Emergency Visit: Yes


ED Registration Date: 03/15/18


Care time: The patient presented to the Emergency Department on the above date 

and was hospitalized for further evaluation of their emergent condition.


Critical Care patient: No





- Discharge Referral


Referred to [facility] No

## 2022-03-10 NOTE — CONSULT
- Consultation


REQUESTING PROVIDER: Edin HUFF





CONSULT REQUEST: We have been asked to surgically evaluate this patient for 

possible symptomatic gallbladder disease





PCP:Alexis Dooley MD





HISTORY OF PRESENT ILLNESS: 58 y/o  female presented to the ED w/ SSCP 

and SOB which is recurrent over the last 10 days; she denies n/v/ fatty food 

intoelerance; pain occurred w/o relation to eating and was originally described 

as "heartburn"; she denies abdominal pain per se and points to her sternum as 

to where the pressure like/burning pain is at this time; a dx. w/u is in 

progress





PMHx:    none      





PSHx:       none 


 Home Medications











 Medication  Instructions  Recorded


 


NK [No Known Home Medication]  03/03/18








 Allergies











Allergy/AdvReac Type Severity Reaction Status Date / Time


 


No Known Allergies Allergy   Verified 03/03/18 02:48














PHYSICAL EXAM:


GENERAL: Awake, alert, and fully oriented, in no acute distress.


HEAD: Normal with no signs of trauma.


EYES: PERRL, sclera anicteric, conjunctiva clear.


NECK: Normal ROM, supple without lymphadenopathy, JVD, or masses.





ABDOMEN: Soft, nontender, not distended, normoactive bowel sounds, no guarding, 

no rebound, no masses.  No organomegaly. No scars; no hernias


MUSCULOSKELETAL: Normal ROM at all joints. No bony deformities or tenderness. 

No CVA tenderness.


UPPER EXTREMITIES: 2+ pulses, warm, well-perfused. No cyanosis. Cap refill <2 

seconds. No peripheral edema.


LOWER EXTREMITIES: 2+ pulses, warm, well-perfused. No calf tenderness. No 

peripheral edema. 


NEUROLOGICAL: Normal speech, gait not observed.


PSYCH: Cooperative. Good eye contact. Appropriate mood and affect.


SKIN: Warm, dry, normal turgor, no rashes or lesions noted.


 Vital Signs











Temperature  98 F   03/03/18 08:10


 


Pulse Rate  69   03/03/18 08:10


 


Respiratory Rate  16   03/03/18 08:10


 


Blood Pressure  127/63   03/03/18 08:10


 


O2 Sat by Pulse Oximetry (%)  100   03/03/18 08:10








 Lab Results











WBC  14.7 K/mm3 (4.0-10.0)  H  03/03/18  04:29    


 


RBC  4.00 M/mm3 (3.60-5.2)   03/03/18  04:29    


 


Hgb  12.5 GM/dL (10.7-15.3)   03/03/18  04:29    


 


Hct  37.2 % (32.4-45.2)   03/03/18  04:29    


 


MCV  92.9 fl (80-96)   03/03/18  04:29    


 


MCHC  33.7 g/dl (32.0-36.0)   03/03/18  04:29    


 


RDW  13.5 % (11.6-15.6)   03/03/18  04:29    


 


Plt Count  264 K/MM3 (134-434)   03/03/18  04:29    


 


Sodium  140 mmol/L (136-145)   03/03/18  09:05    


 


Potassium  4.3 mmol/L (3.5-5.1)   03/03/18  09:05    


 


Chloride  108 mmol/L ()  H  03/03/18  09:05    


 


Carbon Dioxide  26 mmol/L (21-32)   03/03/18  09:05    


 


Anion Gap  6  (8-16)  L  03/03/18  09:05    


 


BUN  11 mg/dL (7-18)   03/03/18  09:05    


 


Creatinine  0.6 mg/dL (0.55-1.02)   03/03/18  09:05    


 


Random Glucose  109 mg/dL ()  H  03/03/18  09:05    


 


Calcium  8.1 mg/dL (8.5-10.1)  L  03/03/18  09:05    


 


INR  1.10  (0.82-1.09)   03/03/18  04:29    








US reviewed/labs reviewed.











IMP:doubt symptomatic gallbladder disease given hx. and physical fndings; would 

continuw w/u for other causes including cardiac and if neg GI eval to r/o GERD /

PUD





PLAN: Would keep NPO/IVF/PPI's; will f/u.





Brent Box MD FACS





Visit type





- Case Type


Case Type: ED Admission





- Emergency


Emergency Visit: Yes


ED Registration Date: 03/03/18


Care time: The patient presented to the Emergency Department on the above date 

and was hospitalized for further evaluation of their emergent condition.





- New patient


This patient is new to me today: Yes


Date on this admission: 03/03/18





- Critical Care


Critical Care patient: No Addended by: SOL PERSON on: 3/10/2022 10:23 AM     Modules accepted: Orders